# Patient Record
Sex: FEMALE | Race: WHITE | NOT HISPANIC OR LATINO | Employment: OTHER | URBAN - METROPOLITAN AREA
[De-identification: names, ages, dates, MRNs, and addresses within clinical notes are randomized per-mention and may not be internally consistent; named-entity substitution may affect disease eponyms.]

---

## 2019-01-17 ENCOUNTER — RESULTS ENCOUNTER (OUTPATIENT)
Dept: ENDOCRINOLOGY | Age: 69
End: 2019-01-17

## 2019-01-17 ENCOUNTER — OFFICE VISIT (OUTPATIENT)
Dept: ENDOCRINOLOGY | Age: 69
End: 2019-01-17

## 2019-01-17 VITALS
DIASTOLIC BLOOD PRESSURE: 70 MMHG | SYSTOLIC BLOOD PRESSURE: 116 MMHG | RESPIRATION RATE: 16 BRPM | WEIGHT: 141 LBS | HEIGHT: 64 IN | BODY MASS INDEX: 24.07 KG/M2

## 2019-01-17 DIAGNOSIS — E03.9 PRIMARY HYPOTHYROIDISM: ICD-10-CM

## 2019-01-17 DIAGNOSIS — E03.9 PRIMARY HYPOTHYROIDISM: Primary | ICD-10-CM

## 2019-01-17 DIAGNOSIS — R53.1 WEAKNESS: ICD-10-CM

## 2019-01-17 DIAGNOSIS — M81.0 POSTMENOPAUSAL OSTEOPOROSIS: ICD-10-CM

## 2019-01-17 DIAGNOSIS — N95.1 MENOPAUSAL SYMPTOMS: ICD-10-CM

## 2019-01-17 DIAGNOSIS — R41.0 CONFUSION: ICD-10-CM

## 2019-01-17 DIAGNOSIS — R53.82 CHRONIC FATIGUE: ICD-10-CM

## 2019-01-17 DIAGNOSIS — E55.9 VITAMIN D DEFICIENCY: ICD-10-CM

## 2019-01-17 PROCEDURE — 99204 OFFICE O/P NEW MOD 45 MIN: CPT | Performed by: INTERNAL MEDICINE

## 2019-01-17 RX ORDER — THYROID,PORK 90 MG
TABLET ORAL
COMMUNITY
Start: 2018-11-28 | End: 2019-02-20

## 2019-01-17 NOTE — PROGRESS NOTES
"Subjective   Diya Zamora is a 68 y.o. female seen as a new patient for hypothyroidism. She states that she also wants to be checked for adrenal fatigue. She states that she is having pain on her right side, rash, brain fog, diarrhea, weakness, and hair loss.   History of Present Illness this is a 68-year-old female known patient with history of hypothyroidism.  She is complaining of pronounced fatigue and weakness and lack of energy which is worsening since about a year and a half to 2 years ago.  She was initially placed on [place] thyroid 30 mg a day and after a while's was switched to Synthroid and levothyroxin and subsequently the dose was increased to [place] Thyroid 60 mg a day and currently for the past 3 months she has been on 90 mg a day.  She is also complaining of occasional diarrhea and her rash on her right side of torso.  Family history is positive for hypothyroidism and Hashimoto in her mother and coronary artery disease in her father which she succumbed to in his early 50s.  She is a mother of 4 and  and not sexually active.  She went through menopause around age 49.  She is also concerned about her bones as well as her gums and bone loss in her gums contributing to loss of teeth.    /70   Resp 16   Ht 162.6 cm (64\")   Wt 64 kg (141 lb)   BMI 24.20 kg/m²      Allergies no known allergies    Current Outpatient Medications:   •  ARMOUR THYROID 90 MG tablet, , Disp: , Rfl:       The following portions of the patient's history were reviewed and updated as appropriate: allergies, current medications, past family history, past medical history, past social history, past surgical history and problem list.    Review of Systems   Constitutional: Positive for fatigue.   HENT: Negative.    Eyes: Negative.    Respiratory: Negative.    Cardiovascular: Negative.    Gastrointestinal: Positive for diarrhea.   Endocrine: Negative.    Genitourinary: Negative.    Musculoskeletal: Negative.  "   Skin: Positive for rash.   Allergic/Immunologic: Negative.    Neurological: Positive for weakness.   Hematological: Negative.    Psychiatric/Behavioral: Positive for confusion.       Objective   Physical Exam   Constitutional: She is oriented to person, place, and time. She appears well-developed and well-nourished. No distress.   HENT:   Head: Normocephalic and atraumatic.   Right Ear: External ear normal.   Left Ear: External ear normal.   Nose: Nose normal.   Mouth/Throat: Oropharynx is clear and moist. No oropharyngeal exudate.   Eyes: Conjunctivae and EOM are normal. Pupils are equal, round, and reactive to light. Right eye exhibits no discharge. Left eye exhibits no discharge. No scleral icterus.   Neck: Normal range of motion. Neck supple. No JVD present. No tracheal deviation present. No thyromegaly present.   Cardiovascular: Normal rate, regular rhythm, normal heart sounds and intact distal pulses. Exam reveals no gallop and no friction rub.   No murmur heard.  Pulmonary/Chest: Effort normal and breath sounds normal. No stridor. No respiratory distress. She has no wheezes. She has no rales. She exhibits no tenderness.   Abdominal: Soft. Bowel sounds are normal. She exhibits no distension and no mass. There is no tenderness. There is no rebound and no guarding. No hernia.   Musculoskeletal: Normal range of motion. She exhibits no edema, tenderness or deformity.   Lymphadenopathy:     She has no cervical adenopathy.   Neurological: She is alert and oriented to person, place, and time. She has normal reflexes. She displays normal reflexes. No cranial nerve deficit or sensory deficit. She exhibits normal muscle tone. Coordination normal.   Skin: Skin is warm and dry. No rash noted. She is not diaphoretic. No erythema. No pallor.   Maculopapular rash on the right side of her flank area.   Psychiatric: She has a normal mood and affect. Her behavior is normal. Judgment and thought content normal.   Nursing note  and vitals reviewed.        Assessment/Plan   Diagnoses and all orders for this visit:    Primary hypothyroidism  -     T3, Free; Future  -     T4 & TSH ([facility]); Future  -     T4, Free; Future  -     Thyroglobulin With Anti-TG; Future  -     Uric Acid; Future  -     Vitamin D 25 Hydroxy; Future  -     Comprehensive Metabolic Panel; Future  -     Follicle Stimulating Hormone; Future  -     Lipid Panel; Future  -     Luteinizing Hormone; Future  -     Prolactin; Future  -     ACTH; Future  -     Cortisol; Future  -     Calcium, Ionized; Future  -     Phosphorus; Future  -     PTH, Intact; Future  -     Celiac Comprehensive Panel; Future  -     DHEA  -     DHEA-Sulfate  -     TestT+TestF+SHBG  -     Growth Hormone  -     Insulin-like Growth Factor    Chronic fatigue  -     T3, Free; Future  -     T4 & TSH ([facility]); Future  -     T4, Free; Future  -     Thyroglobulin With Anti-TG; Future  -     Uric Acid; Future  -     Vitamin D 25 Hydroxy; Future  -     Comprehensive Metabolic Panel; Future  -     Follicle Stimulating Hormone; Future  -     Lipid Panel; Future  -     Luteinizing Hormone; Future  -     Prolactin; Future  -     ACTH; Future  -     Cortisol; Future  -     Calcium, Ionized; Future  -     Phosphorus; Future  -     PTH, Intact; Future  -     Celiac Comprehensive Panel; Future  -     DHEA  -     DHEA-Sulfate  -     TestT+TestF+SHBG  -     Growth Hormone  -     Insulin-like Growth Factor    Weakness  -     T3, Free; Future  -     T4 & TSH ([facility]); Future  -     T4, Free; Future  -     Thyroglobulin With Anti-TG; Future  -     Uric Acid; Future  -     Vitamin D 25 Hydroxy; Future  -     Comprehensive Metabolic Panel; Future  -     Follicle Stimulating Hormone; Future  -     Lipid Panel; Future  -     Luteinizing Hormone; Future  -     Prolactin; Future  -     ACTH; Future  -     Cortisol; Future  -     Calcium, Ionized; Future  -     Phosphorus; Future  -     PTH, Intact; Future  -     Celiac Comprehensive  Panel; Future  -     DHEA  -     DHEA-Sulfate  -     TestT+TestF+SHBG  -     Growth Hormone  -     Insulin-like Growth Factor    Confusion  -     T3, Free; Future  -     T4 & TSH ([facility]); Future  -     T4, Free; Future  -     Thyroglobulin With Anti-TG; Future  -     Uric Acid; Future  -     Vitamin D 25 Hydroxy; Future  -     Comprehensive Metabolic Panel; Future  -     Follicle Stimulating Hormone; Future  -     Lipid Panel; Future  -     Luteinizing Hormone; Future  -     Prolactin; Future  -     ACTH; Future  -     Cortisol; Future  -     Calcium, Ionized; Future  -     Phosphorus; Future  -     PTH, Intact; Future  -     Celiac Comprehensive Panel; Future  -     DHEA  -     DHEA-Sulfate  -     TestT+TestF+SHBG  -     Growth Hormone  -     Insulin-like Growth Factor    Vitamin D deficiency  -     T3, Free; Future  -     T4 & TSH ([facility]); Future  -     T4, Free; Future  -     Thyroglobulin With Anti-TG; Future  -     Uric Acid; Future  -     Vitamin D 25 Hydroxy; Future  -     Comprehensive Metabolic Panel; Future  -     Follicle Stimulating Hormone; Future  -     Lipid Panel; Future  -     Luteinizing Hormone; Future  -     Prolactin; Future  -     ACTH; Future  -     Cortisol; Future  -     Calcium, Ionized; Future  -     Phosphorus; Future  -     PTH, Intact; Future  -     Celiac Comprehensive Panel; Future  -     DHEA  -     DHEA-Sulfate  -     TestT+TestF+SHBG  -     Growth Hormone  -     Insulin-like Growth Factor    Menopausal symptoms  -     T3, Free; Future  -     T4 & TSH ([facility]); Future  -     T4, Free; Future  -     Thyroglobulin With Anti-TG; Future  -     Uric Acid; Future  -     Vitamin D 25 Hydroxy; Future  -     Comprehensive Metabolic Panel; Future  -     Follicle Stimulating Hormone; Future  -     Lipid Panel; Future  -     Luteinizing Hormone; Future  -     Prolactin; Future  -     ACTH; Future  -     Cortisol; Future  -     Calcium, Ionized; Future  -     Phosphorus; Future  -     PTH,  Intact; Future  -     Celiac Comprehensive Panel; Future  -     DHEA  -     DHEA-Sulfate  -     TestT+TestF+SHBG  -     Growth Hormone  -     Insulin-like Growth Factor    Postmenopausal osteoporosis  -     T3, Free; Future  -     T4 & TSH ([facility]); Future  -     T4, Free; Future  -     Thyroglobulin With Anti-TG; Future  -     Uric Acid; Future  -     Vitamin D 25 Hydroxy; Future  -     Comprehensive Metabolic Panel; Future  -     Follicle Stimulating Hormone; Future  -     Lipid Panel; Future  -     Luteinizing Hormone; Future  -     Prolactin; Future  -     ACTH; Future  -     Cortisol; Future  -     Calcium, Ionized; Future  -     Phosphorus; Future  -     PTH, Intact; Future  -     Celiac Comprehensive Panel; Future  -     dexa bone density axial; Future  -     DHEA  -     DHEA-Sulfate  -     TestT+TestF+SHBG  -     Growth Hormone  -     Insulin-like Growth Factor               In summary I saw and examined this 68-year-old female for above-mentioned problems.  I reviewed her below monotonous medical records including multiple laboratory evaluations and at this point we will go ahead and order a comprehensive laboratory evaluation and as soon as the results come back we will go ahead and call for any possible modification or new medications.  I also discussed with her the physiology of thyroid gland as well as pathophysiology of hypothyroidism as well as the functions of adrenal gland.  For the time being I ask her to stay on her current medications until the results of her blood test comes back.  I am also scheduling her for a DEXA scan to assess whether or not she has osteoporosis and how it needs to be treated.  She will see Ms. Rereabraham Devries in 6 months or sooner if needed with laboratory evaluation prior to each office visit.

## 2019-01-19 LAB
DHEA SERPL-MCNC: 290 NG/DL (ref 31–701)
DHEA-S SERPL-MCNC: 163.3 UG/DL (ref 20.4–186.6)
GH SERPL-MCNC: 0.9 NG/ML (ref 0–10)
IGF-I SERPL-MCNC: 137 NG/ML (ref 38–163)
SHBG SERPL-SCNC: 160.1 NMOL/L (ref 17.3–125)
TESTOST FREE SERPL-MCNC: 1.6 PG/ML (ref 0–4.2)
TESTOST SERPL-MCNC: 21 NG/DL (ref 3–41)

## 2019-01-23 ENCOUNTER — HOSPITAL ENCOUNTER (OUTPATIENT)
Dept: BONE DENSITY | Facility: HOSPITAL | Age: 69
Discharge: HOME | End: 2019-01-23
Attending: INTERNAL MEDICINE | Admitting: INTERNAL MEDICINE

## 2019-01-23 DIAGNOSIS — M81.0 POSTMENOPAUSAL OSTEOPOROSIS: ICD-10-CM

## 2019-01-23 PROCEDURE — 77080 DXA BONE DENSITY AXIAL: CPT

## 2019-01-24 RX ORDER — RALOXIFENE HYDROCHLORIDE 60 MG/1
60 TABLET, FILM COATED ORAL DAILY
Qty: 90 TABLET | Refills: 3 | Status: SHIPPED | OUTPATIENT
Start: 2019-01-24 | End: 2020-05-08

## 2019-02-13 LAB
25(OH)D3+25(OH)D2 SERPL-MCNC: 39.7 NG/ML (ref 30–100)
ACTH PLAS-MCNC: 38 PG/ML (ref 7.2–63.3)
ALBUMIN SERPL-MCNC: 4.6 G/DL (ref 3.5–5.2)
ALBUMIN/GLOB SERPL: 1.6 G/DL
ALP SERPL-CCNC: 94 U/L (ref 39–117)
ALT SERPL-CCNC: 19 U/L (ref 1–33)
AST SERPL-CCNC: 13 U/L (ref 1–32)
BILIRUB SERPL-MCNC: 0.3 MG/DL (ref 0.1–1.2)
BUN SERPL-MCNC: 17 MG/DL (ref 8–23)
BUN/CREAT SERPL: 30.4 (ref 7–25)
CA-I SERPL ISE-MCNC: 5.8 MG/DL (ref 4.5–5.6)
CALCIUM SERPL-MCNC: 10 MG/DL (ref 8.6–10.5)
CHLORIDE SERPL-SCNC: 103 MMOL/L (ref 98–107)
CHOLEST SERPL-MCNC: 225 MG/DL (ref 0–200)
CO2 SERPL-SCNC: 24.2 MMOL/L (ref 22–29)
CORTIS SERPL-MCNC: 11.2 UG/DL
CREAT SERPL-MCNC: 0.56 MG/DL (ref 0.57–1)
ENDOMYSIUM IGA SER QL: NEGATIVE
FSH SERPL-ACNC: 52.2 MIU/ML
GLIADIN PEPTIDE IGA SER-ACNC: 3 UNITS (ref 0–19)
GLIADIN PEPTIDE IGG SER-ACNC: 5 UNITS (ref 0–19)
GLOBULIN SER CALC-MCNC: 2.8 GM/DL
GLUCOSE SERPL-MCNC: 100 MG/DL (ref 65–99)
HDLC SERPL-MCNC: 57 MG/DL (ref 40–60)
IGA SERPL-MCNC: 140 MG/DL (ref 87–352)
INTERPRETATION: NORMAL
LDLC SERPL CALC-MCNC: 119 MG/DL (ref 0–100)
LH SERPL-ACNC: 25.5 MIU/ML
PHOSPHATE SERPL-MCNC: 3.6 MG/DL (ref 2.5–4.5)
POTASSIUM SERPL-SCNC: 4.7 MMOL/L (ref 3.5–5.2)
PROLACTIN SERPL-MCNC: 7.4 NG/ML (ref 4.8–23.3)
PROT SERPL-MCNC: 7.4 G/DL (ref 6–8.5)
PTH-INTACT SERPL-MCNC: 27 PG/ML (ref 15–65)
SODIUM SERPL-SCNC: 142 MMOL/L (ref 136–145)
T3FREE SERPL-MCNC: 3.1 PG/ML (ref 2–4.4)
T4 FREE SERPL-MCNC: 1.28 NG/DL (ref 0.93–1.7)
T4 SERPL-MCNC: 8.16 MCG/DL (ref 4.5–11.7)
THYROGLOB AB SERPL-ACNC: 1.2 IU/ML (ref 0–0.9)
THYROGLOB SERPL-MCNC: 7.7 NG/ML
TRIGL SERPL-MCNC: 245 MG/DL (ref 0–150)
TSH SERPL DL<=0.005 MIU/L-ACNC: <0.005 MIU/ML (ref 0.27–4.2)
TTG IGA SER-ACNC: <2 U/ML (ref 0–3)
TTG IGG SER-ACNC: <2 U/ML (ref 0–5)
URATE SERPL-MCNC: 2.3 MG/DL (ref 2.4–5.7)
VLDLC SERPL CALC-MCNC: 49 MG/DL (ref 5–40)

## 2019-02-20 RX ORDER — LEVOTHYROXINE, LIOTHYRONINE 57; 13.5 UG/1; UG/1
90 TABLET ORAL DAILY
Qty: 30 TABLET | Refills: 5 | Status: SHIPPED | OUTPATIENT
Start: 2019-02-20 | End: 2020-05-08

## 2019-02-20 RX ORDER — PITAVASTATIN CALCIUM 4.18 MG/1
TABLET, FILM COATED ORAL
Qty: 30 TABLET | Refills: 5 | Status: SHIPPED | OUTPATIENT
Start: 2019-02-20 | End: 2020-05-08

## 2019-02-20 RX ORDER — ICOSAPENT ETHYL 1000 MG/1
2 CAPSULE ORAL 2 TIMES DAILY WITH MEALS
Qty: 360 CAPSULE | Refills: 3 | Status: SHIPPED | OUTPATIENT
Start: 2019-02-20 | End: 2020-05-08

## 2019-02-28 ENCOUNTER — PRIOR AUTHORIZATION (OUTPATIENT)
Dept: ENDOCRINOLOGY | Age: 69
End: 2019-02-28

## 2022-07-20 ENCOUNTER — TELEPHONE (OUTPATIENT)
Dept: URGENT CARE | Facility: CLINIC | Age: 72
End: 2022-07-20

## 2022-07-20 DIAGNOSIS — L01.00 IMPETIGO: Primary | ICD-10-CM

## 2023-08-07 ENCOUNTER — APPOINTMENT (OUTPATIENT)
Dept: CT IMAGING | Facility: HOSPITAL | Age: 73
End: 2023-08-07
Payer: MEDICARE

## 2023-08-07 ENCOUNTER — HOSPITAL ENCOUNTER (EMERGENCY)
Facility: HOSPITAL | Age: 73
Discharge: HOME | End: 2023-08-07
Attending: EMERGENCY MEDICINE | Admitting: EMERGENCY MEDICINE
Payer: MEDICARE

## 2023-08-07 VITALS
DIASTOLIC BLOOD PRESSURE: 77 MMHG | TEMPERATURE: 98.5 F | WEIGHT: 125 LBS | OXYGEN SATURATION: 95 % | BODY MASS INDEX: 21.34 KG/M2 | HEART RATE: 85 BPM | RESPIRATION RATE: 16 BRPM | HEIGHT: 64 IN | SYSTOLIC BLOOD PRESSURE: 145 MMHG

## 2023-08-07 DIAGNOSIS — E05.90 HYPERTHYROIDISM: Primary | ICD-10-CM

## 2023-08-07 DIAGNOSIS — G89.29 CHRONIC NONINTRACTABLE HEADACHE, UNSPECIFIED HEADACHE TYPE: ICD-10-CM

## 2023-08-07 DIAGNOSIS — R51.9 CHRONIC NONINTRACTABLE HEADACHE, UNSPECIFIED HEADACHE TYPE: ICD-10-CM

## 2023-08-07 LAB
ALBUMIN SERPL-MCNC: 4.4 G/DL (ref 3.5–5.2)
ALBUMIN/GLOB SERPL: 1.6 G/DL
ALP SERPL-CCNC: 86 U/L (ref 39–117)
ALT SERPL W P-5'-P-CCNC: 18 U/L (ref 1–33)
ANION GAP SERPL CALCULATED.3IONS-SCNC: 10.2 MMOL/L (ref 5–15)
AST SERPL-CCNC: 17 U/L (ref 1–32)
BASOPHILS # BLD AUTO: 0.04 10*3/MM3 (ref 0–0.2)
BASOPHILS NFR BLD AUTO: 0.5 % (ref 0–1.5)
BILIRUB SERPL-MCNC: 0.3 MG/DL (ref 0–1.2)
BUN SERPL-MCNC: 11 MG/DL (ref 8–23)
BUN/CREAT SERPL: 19.3 (ref 7–25)
CALCIUM SPEC-SCNC: 9.4 MG/DL (ref 8.6–10.5)
CHLORIDE SERPL-SCNC: 102 MMOL/L (ref 98–107)
CO2 SERPL-SCNC: 22.8 MMOL/L (ref 22–29)
CREAT SERPL-MCNC: 0.57 MG/DL (ref 0.57–1)
DEPRECATED RDW RBC AUTO: 42.3 FL (ref 37–54)
EGFRCR SERPLBLD CKD-EPI 2021: 95.5 ML/MIN/1.73
EOSINOPHIL # BLD AUTO: 0.09 10*3/MM3 (ref 0–0.4)
EOSINOPHIL NFR BLD AUTO: 1.2 % (ref 0.3–6.2)
ERYTHROCYTE [DISTWIDTH] IN BLOOD BY AUTOMATED COUNT: 12.9 % (ref 12.3–15.4)
GLOBULIN UR ELPH-MCNC: 2.8 GM/DL
GLUCOSE SERPL-MCNC: 90 MG/DL (ref 65–99)
HCT VFR BLD AUTO: 41.3 % (ref 34–46.6)
HGB BLD-MCNC: 13.1 G/DL (ref 12–15.9)
IMM GRANULOCYTES # BLD AUTO: 0.02 10*3/MM3 (ref 0–0.05)
IMM GRANULOCYTES NFR BLD AUTO: 0.3 % (ref 0–0.5)
LYMPHOCYTES # BLD AUTO: 3.31 10*3/MM3 (ref 0.7–3.1)
LYMPHOCYTES NFR BLD AUTO: 45 % (ref 19.6–45.3)
MCH RBC QN AUTO: 28.3 PG (ref 26.6–33)
MCHC RBC AUTO-ENTMCNC: 31.7 G/DL (ref 31.5–35.7)
MCV RBC AUTO: 89.2 FL (ref 79–97)
MONOCYTES # BLD AUTO: 0.61 10*3/MM3 (ref 0.1–0.9)
MONOCYTES NFR BLD AUTO: 8.3 % (ref 5–12)
NEUTROPHILS NFR BLD AUTO: 3.28 10*3/MM3 (ref 1.7–7)
NEUTROPHILS NFR BLD AUTO: 44.7 % (ref 42.7–76)
NRBC BLD AUTO-RTO: 0 /100 WBC (ref 0–0.2)
PLATELET # BLD AUTO: 208 10*3/MM3 (ref 140–450)
PMV BLD AUTO: 9.7 FL (ref 6–12)
POTASSIUM SERPL-SCNC: 4.4 MMOL/L (ref 3.5–5.2)
PROT SERPL-MCNC: 7.2 G/DL (ref 6–8.5)
RBC # BLD AUTO: 4.63 10*6/MM3 (ref 3.77–5.28)
SODIUM SERPL-SCNC: 135 MMOL/L (ref 136–145)
TSH SERPL DL<=0.05 MIU/L-ACNC: <0.005 UIU/ML (ref 0.27–4.2)
WBC NRBC COR # BLD: 7.35 10*3/MM3 (ref 3.4–10.8)

## 2023-08-07 PROCEDURE — 36415 COLL VENOUS BLD VENIPUNCTURE: CPT

## 2023-08-07 PROCEDURE — 85025 COMPLETE CBC W/AUTO DIFF WBC: CPT | Performed by: EMERGENCY MEDICINE

## 2023-08-07 PROCEDURE — 70450 CT HEAD/BRAIN W/O DYE: CPT

## 2023-08-07 PROCEDURE — 80053 COMPREHEN METABOLIC PANEL: CPT | Performed by: EMERGENCY MEDICINE

## 2023-08-07 PROCEDURE — 84443 ASSAY THYROID STIM HORMONE: CPT | Performed by: EMERGENCY MEDICINE

## 2023-08-07 PROCEDURE — 99284 EMERGENCY DEPT VISIT MOD MDM: CPT

## 2023-08-07 RX ORDER — LEVOTHYROXINE SODIUM 25 UG/1
25 CAPSULE ORAL DAILY
COMMUNITY
End: 2023-08-07

## 2023-08-07 NOTE — DISCHARGE INSTRUCTIONS
Pt's TSH level is < 0.005.  This is virtually unreadable.  Pt needs her thyroid medication discontinued.

## 2023-08-07 NOTE — ED PROVIDER NOTES
"Subjective   History of Present Illness  73-year-old female past medical history significant for hypothyroidism self-reported Hilary-Barr infection as well as long-haul COVID infection concerns who presents to the emergency room initially complaining of headache.  When I talk with patient she actually states she has been having headaches brain fog fullness of her external ears bilaterally fatigue and generalized malaise for months to years.  Patient states that she has been diagnosed with COVID in the past and even though she tested negative she was later found to have it anyway and as she started having those same symptoms 17 days ago she called her primary care physician who over the phone prescribed her ivermectin.  The primary care physician either saw the patient or test her for COVID.  Patient states she is also been told that she has Hilary-Barr virus and that it is still active most likely because of the COVID infection from years past.  She states that some of her symptoms she has been told is because of the Hilary-Barr virus but she thinks that her symptoms are different now.  Patient is unable to state how she thinks there are different other than this they have been happening for a long time.  Patient states that she was told by a friend who is a nurse that she should probably come in and get \"scanned \"to make sure that she does not have a brain tumor.  Patient denies fever cough neck pain jaw pain sore throat diaphoresis chest pain nausea vomiting.  She denies having any focal neurologic deficits such as weakness in arms or legs difficulty ambulating inability to speak or facial drooping.     Review of Systems   Constitutional:  Positive for fatigue. Negative for activity change, appetite change, chills, diaphoresis and fever.   HENT:  Positive for congestion and ear pain. Negative for rhinorrhea and sore throat.    Eyes:  Negative for photophobia and visual disturbance.   Respiratory:  Negative for " cough.    Cardiovascular:  Negative for chest pain, palpitations and leg swelling.   Gastrointestinal:  Negative for abdominal distention, abdominal pain, diarrhea, nausea and vomiting.   Genitourinary:  Negative for dysuria and flank pain.   Musculoskeletal:  Negative for arthralgias and back pain.   Skin:  Negative for rash.   Neurological:  Positive for headaches. Negative for dizziness and weakness.   Psychiatric/Behavioral:  Negative for agitation and behavioral problems.      Past Medical History:   Diagnosis Date    Hilary Bae infection     Herpes     Hypothyroidism        No Known Allergies    Past Surgical History:   Procedure Laterality Date    TONSILLECTOMY         History reviewed. No pertinent family history.    Social History     Socioeconomic History    Marital status:    Tobacco Use    Smoking status: Former     Packs/day: 1.00     Types: Cigarettes    Smokeless tobacco: Current   Vaping Use    Vaping Use: Never used   Substance and Sexual Activity    Alcohol use: Yes     Comment: rareloy    Drug use: No    Sexual activity: Defer           Objective   Physical Exam  Vitals and nursing note reviewed.   Constitutional:       General: She is not in acute distress.     Appearance: Normal appearance. She is not ill-appearing, toxic-appearing or diaphoretic.      Comments: Pleasant but anxious 73-year-old female in no apparent distress   HENT:      Head: Normocephalic and atraumatic.      Nose: Nose normal.      Mouth/Throat:      Mouth: Mucous membranes are moist.   Eyes:      Conjunctiva/sclera: Conjunctivae normal.   Cardiovascular:      Rate and Rhythm: Normal rate and regular rhythm.      Pulses: Normal pulses.   Pulmonary:      Effort: Pulmonary effort is normal.      Breath sounds: Normal breath sounds.   Abdominal:      General: Abdomen is flat. There is no distension.      Tenderness: There is no abdominal tenderness.   Musculoskeletal:         General: No swelling. Normal range of  motion.      Cervical back: Normal range of motion and neck supple.      Right lower leg: No edema.      Left lower leg: No edema.   Skin:     General: Skin is warm and dry.   Neurological:      General: No focal deficit present.      Mental Status: She is alert and oriented to person, place, and time.   Psychiatric:         Attention and Perception: Attention normal.         Mood and Affect: Mood is anxious.         Speech: Speech normal.         Behavior: Behavior normal.         Thought Content: Thought content normal.         Cognition and Memory: Cognition normal.       Procedures           ED Course  ED Course as of 08/07/23 1829   Mon Aug 07, 2023   1534 Discussed with patient that the lack of focal neurologic findings and the chronicity of her symptoms would indicate that this is most likely not an emergent medical condition however we can do a CT scan of her head to help alleviate concerns.  Patient states she understands and agrees with plan. [BH]   1613 CT head:  IMPRESSION:  Chronic senescent changes. No acute findings.        This report was finalized on 8/7/2023 4:10 PM by Dr. Sebastian Pabon MD.   [BH]   1703 CMP is unremarkable [BH]   1703 CBC is unremarkable [BH]   1807 Patient's primary care physician is a ROSIBEL Slade at [facility] who we have tried to reach at [phone].  There is no one answering this number only a voicemail that she can leave and no instructions on anyone on-call for emergent situations.  I wanted to talk with her primary care physician concerning the fact that she is on thyroid medication however her TSH has been normal so unreadable in the past and is so again today.  We have left a message on the provider's voicemail. [BH]   1819 I spoke at length with the patient concerning her TSH level and the fact that it was elevated in 2019 in addition today.  She states that ROSIBEL Slade has been prescribing her thyroid medication for years and she is  unsure what the levels have been in the past.  I asked the patient if she had a phone number to reach this provider and she states that she does not as that provider has recently left her practice and started out on her own.  She states that she gets a hold of her through Facebook.  I counseled patient that I am concerned that her symptoms could be related to hyperthyroidism and that she needs to stop taking her thyroid medication and have her levels rechecked.  Patient states that she will contact her APRN.  I informed patient I will provide information for her TSH level.  Patient also asked if I would provide information for primary care contact number if she wanted to change primary care physicians.  Patient understands she can follow-up emergency room if needed for new persistent or worsening symptoms. []   1825 Patient also discussed the fact that she states she has Hilary-Barr virus that is active for the last 6 years.  I asked if the patient is seen infectious disease specialist if this is the case and she says now her APRN has been taking care of it.  I suggested to her that if she truly has active Hilary-Barr virus for 6 years that she should probably be referred to an infectious disease specialist. []      ED Course User Index  [] Chino Tellez MD                                           Medical Decision Making  My differential diagnosis for headache includes but is not limited to:  Migraine, cluster, ischemic stroke, subarachnoid hemorrhage, intracranial hemorrhage, vascular malformation, cerebral aneurysm, vascular dissection, vasculitis, temporal arteritis, malignant hypertension, pheochromocytoma, cerebral venous thrombosis, preeclampsia; bacterial meningitis, viral meningitis, fungal meningitis, encephalitis, brain abscess, pleural empyema, sinusitis, dental infection, influenza, viral syndrome; carbon monoxide exposure, analgesic abuse, hypoglycemia; trigeminal neuralgia,  postherpetic neuralgia, occipital neuralgia; subdural hematoma, concussion, musculoskeletal tension, cervical osteoarthritis; glaucoma, TMJ disease, pseudotumor cerebri, post LP headache, intracranial neoplasm, sleep apnea     Amount and/or Complexity of Data Reviewed  Radiology: ordered. Decision-making details documented in ED Course.        Final diagnoses:   Hyperthyroidism   Chronic nonintractable headache, unspecified headache type       ED Disposition  ED Disposition       ED Disposition   Discharge    Condition   Stable    Comment   --               PATIENT CONNECTION - [facility] [address]  [phone]  Call       Stella Buckley, APRN  [address] [facility]  [address] [place] [address]  [phone]    Schedule an appointment as soon as possible for a visit       [facility] EMERGENCY DEPARTMENT  [address] [facility] [address] [phone]  Go to   As needed         Medication List        Stop      [place] Thyroid 30 MG tablet  Generic drug: Thyroid     IVERMECTIN PO     liothyronine 5 MCG tablet  Commonly known as: CYTOMEL     Tirosint 25 MCG capsule  Generic drug: levothyroxine sodium                 Chino Tellez MD  08/07/23 1827

## 2023-11-09 ENCOUNTER — PATIENT ROUNDING (BHMG ONLY) (OUTPATIENT)
Dept: FAMILY MEDICINE CLINIC | Facility: CLINIC | Age: 73
End: 2023-11-09
Payer: MEDICARE

## 2023-11-09 ENCOUNTER — OFFICE VISIT (OUTPATIENT)
Dept: FAMILY MEDICINE CLINIC | Facility: CLINIC | Age: 73
End: 2023-11-09
Payer: MEDICARE

## 2023-11-09 VITALS
OXYGEN SATURATION: 99 % | WEIGHT: 125 LBS | RESPIRATION RATE: 18 BRPM | BODY MASS INDEX: 21.34 KG/M2 | HEIGHT: 64 IN | DIASTOLIC BLOOD PRESSURE: 82 MMHG | SYSTOLIC BLOOD PRESSURE: 118 MMHG | HEART RATE: 83 BPM

## 2023-11-09 DIAGNOSIS — U09.9 LONG COVID: ICD-10-CM

## 2023-11-09 DIAGNOSIS — E03.9 PRIMARY HYPOTHYROIDISM: Primary | ICD-10-CM

## 2023-11-09 NOTE — PROGRESS NOTES
A [email or web address] message has been sent to the patient for [place]IENT [place] [facility]

## 2023-11-09 NOTE — PROGRESS NOTES
John Leroy DO  [facility] PRIMARY [facility]ARE  [address]  [place] [id number] [phone]    Subjective      Name Diya Zamora MRN [id number]    1950 AGE/SEX 73 y.o. / female      Chief Complaint Chief Complaint   Patient presents with    Hypothyroidism     New patient here to establish care. Would like to discuss thyroid issues     Ear Fullness     Left ear fullness    Fibromyalgia         Visit History for  2023    History of Present Illness  Diya Zamora is a 73 y.o. female who presented today for Hypothyroidism (New patient here to establish care. Would like to discuss thyroid issues ), Ear Fullness (Left ear fullness), and Fibromyalgia    New patient presents to establish care with a new provider. Accompanied by adult female friend.     Last primary care provider was Stella Buckley NP.    Only on levothyroxine and Tirosint, which are working. Has tried many medications for thyroid since 2017 and had difficulty finding something that has worked. Last TSH levels were low and had a 17-day headache that resulted in an emergency room visit. The physician in the emergency room advised patient to discontinue thyroid medication.     Reports left ear fullness, phlegm, and head congestion. Has been doing lymphatic drainage, auricular treatment, and Rife treatments. Nebulizes daily with 1 teaspoon of silver and 1 drop of grapefruit seed supplements. Denies allergies. Has used Allegra with long COVID-19. Does not like Flonase because lost sense of smell on it.    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 Deals with brain fog and diagnosed with long COVID-19 in 2023 by Stella Buckley. Was on ivermectin, steroids, Pepcid, Allegra, and CoQ10. Is not able to remember things. Had COVID-19 in 2020 and in 2022 along with double pneumonia. Started on nicotine protocol, by Dr. Corbett, for the last 2 days and feels mental clarity and  "fatigue improvement already. Started root cause protocol in 2016 and is hoping to use alternative medicine to heal and hopes all issues will disappear. Follows Root Call Protocol.     Has chronic diarrhea and uses vitamin B supplements.      Medications and Allergies   Current Outpatient Medications   Medication Instructions    liothyronine (CYTOMEL) 25 MCG tablet Take 1 tablet by mouth 2 (Two) Times a Day.    liothyronine (CYTOMEL) 5 MCG tablet 2 tablets, Oral, Daily    MAGNESIUM PO Oral    Tirosint 25 MCG capsule     triamcinolone (KENALOG) 0.1 % ointment Topical, 2 Times Daily    TURMERIC CURCUMIN PO Oral    vitamin C (ASCORBIC ACID) 250 mg, Oral, Daily     No Known Allergies   I have reviewed the above medications and allergies     Objective:      Vitals Vitals:    11/09/23 1320   BP: 118/82   BP Location: Left arm   Patient Position: Sitting   Cuff Size: Adult   Pulse: 83   Resp: 18   SpO2: 99%   Weight: 56.7 kg (125 lb)   Height: 162.6 cm (64\")     Body mass index is 21.46 kg/m².    Physical Exam  Vitals reviewed.   Constitutional:       General: She is not in acute distress.     Appearance: She is not ill-appearing.   HENT:      Ears:      Comments: Slight fluid in the left tympanic membrane.   Cardiovascular:      Rate and Rhythm: Normal rate and regular rhythm.   Pulmonary:      Effort: Pulmonary effort is normal.      Breath sounds: Normal breath sounds.   Neurological:      Mental Status: She is alert.   Psychiatric:         Mood and Affect: Mood normal.         Behavior: Behavior normal.         Thought Content: Thought content normal.         Judgment: Judgment normal.            Assessment/Plan      Issues Addressed/ Plan  1. Primary hypothyroidism  - Continue current medication regimen.    2. Long COVID  - Has ivermectin refill from previous primary care provider if needed.    Ear congestion  - Educated on mechanism of Rian technique. Recommended to try Allegra again for about 1 month. Consider " referral to ENT specialist if symptoms do not subside.       There are no Patient Instructions on file for this visit.  BMI is within normal parameters. No other follow-up for BMI required.        Follow up  recommended Return in about 6 months (around 5/9/2024).   - Dragon voice recognition software was utilized to complete this chart.  Every reasonable attempt was made to edit and correct the text, however some incorrect words may remain.   John Leroy DO    Transcribed from ambient dictation for John Leroy DO by Lesvia Hutchinson  11/09/23   17:25 EST    Patient or patient representative verbalized consent to the visit recording.  I have personally performed the services described in this document as transcribed by the above individual, and it is both accurate and complete.

## 2023-11-17 ENCOUNTER — TELEPHONE (OUTPATIENT)
Dept: FAMILY MEDICINE CLINIC | Facility: CLINIC | Age: 73
End: 2023-11-17

## 2023-11-17 NOTE — TELEPHONE ENCOUNTER
Caller: Diya Zamora    Relationship: Self    Best call back number: [phone]    Which medication are you concerned about: Tirosint 25 MCG capsule     Who prescribed you this medication: DR SANABRIA     When did you start taking this medication:     What are your concerns: PATIENT RECEIVED LETTER FROM [employer] STATING MEDICATION WOULD NOT BE COVERED ON HER PLAN EFFECTIVE JAN 1, 2024. LETTER STATES THAT PATIENT CAN ASK PROVIDER TO REQUEST AN EXCEPTION BECAUSE OTHER MEDICATIONS SHE HAS TRIED HAVE NOT WORKED FOR HER.   PLEASE ADVISE PATIENT IF DR SANABRIA WILL SEND REQUEST TO [facility] ON PATIENT'S BEHALF  PATIENT DOES NOT NEED A REFILL RIGHT NOW, BUT WOULD LIKE TO KEEP TAKING THIS MEDICATION IN THE NEW YEAR     How long have you had these concerns:

## 2023-11-28 ENCOUNTER — DOCUMENTATION (OUTPATIENT)
Dept: FAMILY MEDICINE CLINIC | Facility: CLINIC | Age: 73
End: 2023-11-28
Payer: MEDICARE

## 2023-11-28 RX ORDER — CAT'S CLAW 500 MG
500 CAPSULE ORAL 3 TIMES DAILY
COMMUNITY

## 2023-11-28 RX ORDER — WHEAT GERM OIL
385 OIL (ML) ORAL
COMMUNITY

## 2023-11-28 RX ORDER — GLUTAMINE 100 %
POWDER (GRAM) ORAL
COMMUNITY

## 2024-01-09 ENCOUNTER — TELEPHONE (OUTPATIENT)
Dept: FAMILY MEDICINE CLINIC | Facility: CLINIC | Age: 74
End: 2024-01-09
Payer: MEDICARE

## 2024-01-09 RX ORDER — LEVOTHYROXINE SODIUM 25 UG/1
CAPSULE ORAL
Qty: 90 CAPSULE | Refills: 1 | Status: SHIPPED | OUTPATIENT
Start: 2024-01-09

## 2024-01-09 NOTE — TELEPHONE ENCOUNTER
Caller: Diya Zamora    Relationship: Self    Best call back number: [phone] (Mobile) [phone] HOME IF NO ANSWER ON CELL BUT DON'T LEAVE VM ON HOME    What test/procedure requested: Tirosint 25 MCG capsule     When is it needed: ASAP- PATIENT ONLY HAS ABOUT 10 DAYS OF MEDICATION LEFT AND NEEDS THIS MEDICATION ASAP    Where is the test/procedure going to be performed: [id number] [place] [facility] [address] - [phone]  - [phone] FX     Additional information or concerns: PATIENT STATES SHE NEEDS A REFILL FOR 90 DAY SUPPLY SENT TO PHARMACY ASAP ALSO    PHARMACY TOLD PATIENT THAT THIS ALSO REQUIRES PRIOR AUTH DUE TO NEW YEAR    PLEASE ADVISE PATIENT REGARDING THIS ASAP

## 2024-01-11 RX ORDER — LIOTHYRONINE SODIUM 25 UG/1
25 TABLET ORAL 2 TIMES DAILY
Qty: 180 TABLET | Refills: 1 | Status: SHIPPED | OUTPATIENT
Start: 2024-01-11

## 2024-01-11 NOTE — TELEPHONE ENCOUNTER
Approvedon January 9  The request has been approved. The authorization is effective from 01/09/2024 to 01/08/2025, as long as the member is enrolled in their current health plan. The request was approved as submitted. A written notification letter will follow with additional details.

## 2024-01-11 NOTE — TELEPHONE ENCOUNTER
Name: Diya Zamora      Relationship: Self      Best Callback Number: [phone]       [employer] PROVIDED THE RELAY MESSAGE FROM THE OFFICE      PATIENT: HAS FURTHER QUESTIONS AND WOULD LIKE A CALL BACK AT THE FOLLOWING PHONE [phone]    ADDITIONAL INFORMATION:[employer] TOLD HER THIS MORNING THEY HAVE NOTHING ON FILE FOR PA

## 2024-01-16 ENCOUNTER — TELEPHONE (OUTPATIENT)
Dept: FAMILY MEDICINE CLINIC | Facility: CLINIC | Age: 74
End: 2024-01-16
Payer: MEDICARE

## 2024-01-16 NOTE — TELEPHONE ENCOUNTER
Caller: RYLAND STEPHENS    Relationship to patient: Other    Best call back number: [phone]    Patient is needing: RYLAND IS CALLING TO LET THE PATIENT'S PROVIDER KNOW THAT THE PATIENT'S PAPERWORK HAS BEEN UPDATED ON RYLAND'S WEBSITE. THE PROVIDER IS INVITED TO ATTEND ROUNDS BY TELEPHONE NEXT WEEK. THE NUMBER FOR THIS [phone], CHOOSE OPTION 3.

## 2024-02-09 ENCOUNTER — OFFICE VISIT (OUTPATIENT)
Dept: FAMILY MEDICINE CLINIC | Facility: CLINIC | Age: 74
End: 2024-02-09
Payer: MEDICARE

## 2024-02-09 VITALS
OXYGEN SATURATION: 97 % | RESPIRATION RATE: 18 BRPM | WEIGHT: 126 LBS | HEIGHT: 64 IN | HEART RATE: 96 BPM | DIASTOLIC BLOOD PRESSURE: 76 MMHG | SYSTOLIC BLOOD PRESSURE: 124 MMHG | BODY MASS INDEX: 21.51 KG/M2

## 2024-02-09 DIAGNOSIS — R41.0 CONFUSION: Primary | ICD-10-CM

## 2024-02-09 DIAGNOSIS — R53.82 CHRONIC FATIGUE: ICD-10-CM

## 2024-02-09 DIAGNOSIS — E55.9 VITAMIN D DEFICIENCY: ICD-10-CM

## 2024-02-09 DIAGNOSIS — E03.9 PRIMARY HYPOTHYROIDISM: ICD-10-CM

## 2024-02-09 DIAGNOSIS — R73.9 HYPERGLYCEMIA: ICD-10-CM

## 2024-02-09 DIAGNOSIS — G43.819 OTHER MIGRAINE WITHOUT STATUS MIGRAINOSUS, INTRACTABLE: ICD-10-CM

## 2024-02-09 DIAGNOSIS — E78.2 MIXED HYPERLIPIDEMIA: ICD-10-CM

## 2024-02-09 RX ORDER — KETOROLAC TROMETHAMINE 30 MG/ML
60 INJECTION, SOLUTION INTRAMUSCULAR; INTRAVENOUS ONCE
Status: COMPLETED | OUTPATIENT
Start: 2024-02-09 | End: 2024-02-09

## 2024-02-09 RX ADMIN — KETOROLAC TROMETHAMINE 60 MG: 30 INJECTION, SOLUTION INTRAMUSCULAR; INTRAVENOUS at 14:18

## 2024-02-09 NOTE — PROGRESS NOTES
John Leroy,   [facility]  [address]  [place] [id number] [phone]    Subjective      Name Diya Zamora MRN [id number]    1950 AGE/SEX 73 y.o. / female      Chief Complaint Chief Complaint   Patient presents with    Hypothyroidism     Follow up and would like labs     Headache     Headache and brain fog for the past few days          Visit History for  2024    History of Present Illness  Diya Zamora is a 73 y.o. female who presented today for Hypothyroidism (Follow up and would like labs ) and Headache (Headache and brain fog for the past few days )    Headache for several days and is interested in getting a Toradol injection. Dealing with brain fog and cognitive issues. Can hardly think at all. Sleeps fine. About 6 months ago, started waking up in the middle of the night with night terrors and has never experienced this before. Took 2 ibuprofens today, about 2 hours later took 2 Tylenol, with no relief.    Still having diarrhea. Stopped taking probiotics. Started having yogurt or sauerkraut every day. Taking calcium, L-glutamine 2 scoops a day for the gut health, and it has helped with the diarrhea.    Doing the Limbic System Rewire. Tried keto, plant-based diet, autoimmune protocols, and a noninflammatory diet, but it did not seem to make any difference. Eating a lot of fruits and vegetables, a little bit of meat, and some dark chocolates. Took turmeric in the past.     Would like to get the free T3, free T4, reverse T3, TSH, and antibodies labs.  Also would like to get her magnesium, RBC, vitamin D, 25-hydroxy, copper, and A1c checked. Tempted to stop all supplements, but wants to see the magnesium level before stopping. Taking vitamin D 5000 IU daily.         Medications and Allergies   Current Outpatient Medications   Medication Instructions    BEE POLLEN PO Oral    CALCIUM LACTATE  mg, Oral    Cats Claw 500 mg, Oral, 3  "Times Daily    CINNAMON PO 1,000 mg, Oral    COD LIVER OIL PO Oral    glutamine powder Topical    liothyronine (CYTOMEL) 5 MCG tablet 2 tablets, Oral, Daily    liothyronine (CYTOMEL) 25 mcg, Oral, 2 Times Daily    Lysine 1000 MG tablet Oral    MAGNESIUM PO Oral    Medium Chain Triglycerides (MCT OIL PO) Oral, 2 TBS    Nutritional Supplements (NUTRITIONAL SUPPLEMENT PO) 500 mg, Oral, Jigsaw mag SRT B- free    Nutritional Supplements (NUTRITIONAL SUPPLEMENT PO) Oral, 2 Times Daily, Integrative therapeutics panplex 2 phase <BR>1-2 with meals     Nutritional Supplements (NUTRITIONAL SUPPLEMENT PO) 400 mg, Oral, SAMUELS PASTE CURCUMIN     Nutritional Supplements (NUTRITIONAL SUPPLEMENT PO) Oral    Nutritional Supplements (NUTRITIONAL SUPPLEMENT PO) 400 mg, Oral, SAMUELS PASTE MADE WITH TUMERIC, COCONUT OIL , PEPPER    OIL OF OREGANO PO Oral, 4 PER DAY    OLIVE LEAF EXTRACT  mg, Oral, 3 Times Daily    PATIENT SUPPLIED MEDICATION PERFECT RODIALA PERRY 400 MG     PATIENT SUPPLIED MEDICATION FIQ RECUPERALE 1Q COPPER 2 MG     PATIENT SUPPLIED MEDICATION KAREN SEA MINERAL 2.0 ( 2 DROPPER )    PATIENT SUPPLIED MEDICATION DIATOMACEOUS EARTH 1 TSP    TAURINE  mg, Oral, PURE ENCAPSULATE TAURINE     Tirosint 25 MCG capsule TAKE 1 CAPSULE(25 MCG) BY MOUTH DAILY IN THE MORNING ON AN EMPTY STOMACH    triamcinolone (KENALOG) 0.1 % ointment Topical, 2 Times Daily    TURMERIC CURCUMIN PO Oral    vitamin C (ASCORBIC ACID) 250 mg, Oral, Daily    vitamin D3 5,000 Units, Oral, Daily    Wheat Germ Oil oil 385 mg, Oral     No Known Allergies   I have reviewed the above medications and allergies     Objective:      Vitals Vitals:    02/09/24 1306   BP: 124/76   BP Location: Left arm   Patient Position: Sitting   Cuff Size: Adult   Pulse: 96   Resp: 18   SpO2: 97%   Weight: 57.2 kg (126 lb)   Height: 162.6 cm (64\")     Body mass index is 21.63 kg/m².    Physical Exam  Vitals reviewed.   Constitutional:       General: She is not in " acute distress.     Appearance: She is not ill-appearing.   Cardiovascular:      Rate and Rhythm: Normal rate and regular rhythm.   Pulmonary:      Effort: Pulmonary effort is normal.      Breath sounds: Normal breath sounds.   Neurological:      Mental Status: She is alert.   Psychiatric:         Mood and Affect: Mood normal.         Behavior: Behavior normal.         Thought Content: Thought content normal.         Judgment: Judgment normal.            Assessment/Plan      Issues Addressed/ Plan   Diagnosis Plan   1. Confusion  MAGNESIUM, RBC    Copper, Serum    Hemoglobin A1c    Selenium Serum      2. Chronic fatigue  MAGNESIUM, RBC    Vitamin D 25 hydroxy    Copper, Serum    Comprehensive metabolic panel    Hemoglobin A1c    Selenium Serum    CBC w AUTO Differential      3. Vitamin D deficiency  Vitamin D 25 hydroxy      4. Primary hypothyroidism  Hemoglobin A1c    TSH    T4, free    T3, free    T3, Reverse      5. Mixed hyperlipidemia  Lipid panel    Hemoglobin A1c      6. Hyperglycemia  Hemoglobin A1c      7. Other migraine without status migrainosus, intractable  ketorolac (TORADOL) injection 60 mg        1. Headache.  Her headache could be due to inflammation. I will give her a Toradol injection. She will discontinue ibuprofen today. She will try an anti-inflammatory diet.     2. Diarrhea.  She will continue L-glutamine.    3. Health maintenance.  Blood work today including free T3, free T4, reverse T3, TSH, antibodies, magnesium, RBC, vitamin D, 25-hydroxy, copper, and A1c.      There are no Patient Instructions on file for this visit.  BMI is within normal parameters. No other follow-up for BMI required.         Follow up  recommended No follow-ups on file.   - Dragon voice recognition software was utilized to complete this chart.  Every reasonable attempt was made to edit and correct the text, however some incorrect words may remain.   John Leroy DO    Transcribed from ambient dictation for John  DO Rad by Hetal Rendon.  02/09/24   14:46 EST    Patient or patient representative verbalized consent to the visit recording.  I have personally performed the services described in this document as transcribed by the above individual, and it is both accurate and complete.

## 2024-02-09 NOTE — PROGRESS NOTES
Venipuncture Blood Specimen Collection  Venipuncture performed in left arm by Kat Carolina MA with good hemostasis. Patient tolerated the procedure well without complications.   02/09/24   Kat Carolina MA

## 2024-02-19 ENCOUNTER — TELEPHONE (OUTPATIENT)
Dept: FAMILY MEDICINE CLINIC | Facility: CLINIC | Age: 74
End: 2024-02-19
Payer: MEDICARE

## 2024-02-19 NOTE — TELEPHONE ENCOUNTER
Caller: Diya Zamora    Relationship: Self    Best call back number: [phone]    Caller requesting test results: PATIENT    What test was performed: LABS    When was the test performed: 2/9/24    Where was the test performed: OFFICE      IF SHE IS NOT AVAILABLE PLEASE LEAVE HER A MESSAGE

## 2024-02-21 LAB
25(OH)D3+25(OH)D2 SERPL-MCNC: 52.1 NG/ML (ref 30–100)
ALBUMIN SERPL-MCNC: 4.6 G/DL (ref 3.8–4.8)
ALBUMIN/GLOB SERPL: 1.8 {RATIO} (ref 1.2–2.2)
ALP SERPL-CCNC: 78 IU/L (ref 44–121)
ALT SERPL-CCNC: 18 IU/L (ref 0–32)
AST SERPL-CCNC: 21 IU/L (ref 0–40)
BASOPHILS # BLD AUTO: 0 X10E3/UL (ref 0–0.2)
BASOPHILS NFR BLD AUTO: 1 %
BILIRUB SERPL-MCNC: 0.4 MG/DL (ref 0–1.2)
BUN SERPL-MCNC: 12 MG/DL (ref 8–27)
BUN/CREAT SERPL: 17 (ref 12–28)
CALCIUM SERPL-MCNC: 10.4 MG/DL (ref 8.7–10.3)
CHLORIDE SERPL-SCNC: 101 MMOL/L (ref 96–106)
CHOLEST SERPL-MCNC: 224 MG/DL (ref 100–199)
CO2 SERPL-SCNC: 23 MMOL/L (ref 20–29)
COPPER SERPL-MCNC: 126 UG/DL (ref 80–158)
CREAT SERPL-MCNC: 0.72 MG/DL (ref 0.57–1)
EGFRCR SERPLBLD CKD-EPI 2021: 88 ML/MIN/1.73
EOSINOPHIL # BLD AUTO: 0.1 X10E3/UL (ref 0–0.4)
EOSINOPHIL NFR BLD AUTO: 1 %
ERYTHROCYTE [DISTWIDTH] IN BLOOD BY AUTOMATED COUNT: 12 % (ref 11.7–15.4)
GLOBULIN SER CALC-MCNC: 2.5 G/DL (ref 1.5–4.5)
GLUCOSE SERPL-MCNC: 98 MG/DL (ref 70–99)
HBA1C MFR BLD: 5.8 % (ref 4.8–5.6)
HCT VFR BLD AUTO: 44 % (ref 34–46.6)
HDLC SERPL-MCNC: 69 MG/DL
HGB BLD-MCNC: 14.1 G/DL (ref 11.1–15.9)
IMM GRANULOCYTES # BLD AUTO: 0 X10E3/UL (ref 0–0.1)
IMM GRANULOCYTES NFR BLD AUTO: 0 %
LDLC SERPL CALC-MCNC: 138 MG/DL (ref 0–99)
LYMPHOCYTES # BLD AUTO: 2.6 X10E3/UL (ref 0.7–3.1)
LYMPHOCYTES NFR BLD AUTO: 44 %
MAGNESIUM RBC-MCNC: 4.6 MG/DL (ref 3.7–7)
MCH RBC QN AUTO: 27.8 PG (ref 26.6–33)
MCHC RBC AUTO-ENTMCNC: 32 G/DL (ref 31.5–35.7)
MCV RBC AUTO: 87 FL (ref 79–97)
MONOCYTES # BLD AUTO: 0.5 X10E3/UL (ref 0.1–0.9)
MONOCYTES NFR BLD AUTO: 9 %
NEUTROPHILS # BLD AUTO: 2.7 X10E3/UL (ref 1.4–7)
NEUTROPHILS NFR BLD AUTO: 45 %
PLATELET # BLD AUTO: 233 X10E3/UL (ref 150–450)
POTASSIUM SERPL-SCNC: 4 MMOL/L (ref 3.5–5.2)
PROT SERPL-MCNC: 7.1 G/DL (ref 6–8.5)
RBC # BLD AUTO: 5.08 X10E6/UL (ref 3.77–5.28)
SELENIUM SERPL-MCNC: 116 UG/L (ref 93–198)
SODIUM SERPL-SCNC: 138 MMOL/L (ref 134–144)
T3FREE SERPL-MCNC: 4.1 PG/ML (ref 2–4.4)
T3REVERSE SERPL-MCNC: 8.6 NG/DL (ref 9.2–24.1)
T4 FREE SERPL-MCNC: 0.48 NG/DL (ref 0.82–1.77)
TRIGL SERPL-MCNC: 99 MG/DL (ref 0–149)
TSH SERPL DL<=0.005 MIU/L-ACNC: <0.005 UIU/ML (ref 0.45–4.5)
VLDLC SERPL CALC-MCNC: 17 MG/DL (ref 5–40)
WBC # BLD AUTO: 5.9 X10E3/UL (ref 3.4–10.8)

## 2024-03-12 ENCOUNTER — TELEPHONE (OUTPATIENT)
Dept: FAMILY MEDICINE CLINIC | Facility: CLINIC | Age: 74
End: 2024-03-12

## 2024-03-12 ENCOUNTER — OFFICE VISIT (OUTPATIENT)
Dept: FAMILY MEDICINE CLINIC | Facility: CLINIC | Age: 74
End: 2024-03-12
Payer: MEDICARE

## 2024-03-12 VITALS
SYSTOLIC BLOOD PRESSURE: 124 MMHG | WEIGHT: 129 LBS | DIASTOLIC BLOOD PRESSURE: 66 MMHG | HEIGHT: 64 IN | BODY MASS INDEX: 22.02 KG/M2 | HEART RATE: 78 BPM | OXYGEN SATURATION: 98 %

## 2024-03-12 DIAGNOSIS — Z09 ENCOUNTER FOR FOLLOW-UP EXAMINATION AFTER COMPLETED TREATMENT FOR CONDITIONS OTHER THAN MALIGNANT NEOPLASM: ICD-10-CM

## 2024-03-12 DIAGNOSIS — M85.80 OSTEOPENIA, UNSPECIFIED LOCATION: ICD-10-CM

## 2024-03-12 DIAGNOSIS — Z00.00 MEDICARE ANNUAL WELLNESS VISIT, SUBSEQUENT: Primary | ICD-10-CM

## 2024-03-12 PROCEDURE — G0439 PPPS, SUBSEQ VISIT: HCPCS | Performed by: STUDENT IN AN ORGANIZED HEALTH CARE EDUCATION/TRAINING PROGRAM

## 2024-03-12 PROCEDURE — 1160F RVW MEDS BY RX/DR IN RCRD: CPT | Performed by: STUDENT IN AN ORGANIZED HEALTH CARE EDUCATION/TRAINING PROGRAM

## 2024-03-12 PROCEDURE — 1159F MED LIST DOCD IN RCRD: CPT | Performed by: STUDENT IN AN ORGANIZED HEALTH CARE EDUCATION/TRAINING PROGRAM

## 2024-03-12 NOTE — TELEPHONE ENCOUNTER
Caller: Diya Zamora    Relationship: Self    Best call back number: [phone]    What is the best time to reach you: ANYTIME     What was the call regarding: PATIENT WOULD LIKE TO KNOW IF A LIVING WILL KIT COULD BE MAILED TO HER HOME ADDRESS.

## 2024-03-12 NOTE — PROGRESS NOTES
The ABCs of the Annual Wellness Visit  Subsequent Medicare Wellness Visit    Chief Complaint   Patient presents with    Medicare Wellness-subsequent      Subjective    History of Present Illness:  Diya Zamora is a 73 y.o. female who presents for a Subsequent Medicare Wellness Visit.    The following portions of the patient's history were reviewed and   updated as appropriate: allergies, current medications, past family history, past medical history, past social history, past surgical history, and problem list.    Compared to one year ago, the patient feels her physical   health is better.    Compared to one year ago, the patient feels her mental   health is better.    Recent Hospitalizations:  She was not admitted to the hospital during the last year.       Current Medical Providers:  Patient Care Team:  John Leroy DO as PCP - General (Family Medicine)    Outpatient Medications Prior to Visit   Medication Sig Dispense Refill    BEE POLLEN PO Take  by mouth.      Berberine Chloride (BERBERINE HCI PO) Take 1,000 mg by mouth Daily.      CALCIUM LACTATE PO Take 260 mg by mouth.      glutamine powder 3,000 mg Daily.      liothyronine (CYTOMEL) 25 MCG tablet Take 1 tablet by mouth 2 (Two) Times a Day. 180 tablet 1    liothyronine (CYTOMEL) 5 MCG tablet Take 2 tablets by mouth Daily.      Lysine 1000 MG tablet Take  by mouth.      MAGNESIUM PO Take  by mouth.      Medium Chain Triglycerides (MCT OIL PO) Take  by mouth. 2 TBS      Nutritional Supplements (NUTRITIONAL SUPPLEMENT PO) Take 500 mg by mouth. Jigsaw mag SRT B- free      Nutritional Supplements (NUTRITIONAL SUPPLEMENT PO) Take  by mouth 2 (Two) Times a Day. Integrative therapeutics panplex 2 phase   1-2 with meals      Nutritional Supplements (NUTRITIONAL SUPPLEMENT PO) Take 400 mg by mouth. SAMUELS PASTE MADE WITH TUMERIC, COCONUT OIL , PEPPER      PATIENT SUPPLIED MEDICATION PERFECT RODIALA PERRY 400 MG      PATIENT SUPPLIED MEDICATION FIQ RECUPERALE  1Q COPPER 2 MG      PATIENT SUPPLIED MEDICATION KAREN SEA MINERAL 2.0 ( 2 DROPPER )      PATIENT SUPPLIED MEDICATION DIATOMACEOUS EARTH 1 TSP      Selenium 200 MCG capsule Take 200 mg by mouth Daily.      TAURINE PO Take 500 mg by mouth. PURE ENCAPSULATE TAURINE      Tirosint 25 MCG capsule TAKE 1 CAPSULE(25 MCG) BY MOUTH DAILY IN THE MORNING ON AN EMPTY STOMACH 90 capsule 1    TURMERIC CURCUMIN PO Take  by mouth.      vitamin C (ASCORBIC ACID) 250 MG tablet Take 1 tablet by mouth Daily.      vitamin D3 125 MCG (5000 UT) capsule capsule Take 1 capsule by mouth Daily.      Wheat Germ Oil oil Take 385 mg by mouth.      Cats Claw 500 MG capsule Take 500 mg by mouth 3 (Three) Times a Day.      COD LIVER OIL PO Take  by mouth.      OIL OF OREGANO PO Take  by mouth. 4 PER DAY      OLIVE LEAF EXTRACT PO Take 400 mg by mouth 3 (Three) Times a Day.      triamcinolone (KENALOG) 0.1 % ointment Apply  topically to the appropriate area as directed 2 (Two) Times a Day. 1 tube 0    CINNAMON PO Take 1,000 mg by mouth. (Patient not taking: Reported on 3/12/2024)      Nutritional Supplements (NUTRITIONAL SUPPLEMENT PO) Take 400 mg by mouth. SAMUELS PASTE CURCUMIN      Nutritional Supplements (NUTRITIONAL SUPPLEMENT PO) Take  by mouth.       No facility-administered medications prior to visit.       No opioid medication identified on active medication list. I have reviewed chart for other potential  high risk medication/s and harmful drug interactions in the elderly.        Aspirin is not on active medication list.  Aspirin use is not indicated based on review of current medical condition/s. Risk of harm outweighs potential benefits.  .    Patient Active Problem List   Diagnosis    Confusion    Chronic fatigue    Primary hypothyroidism    Postmenopausal osteoporosis    Vitamin D deficiency    Menopausal symptoms     Advance Care Planning  Advance Directive is not on file.  ACP discussion was held with the patient during this visit.  "Patient does not have an advance directive, information provided.          Objective    Vitals:    03/12/24 1049   BP: 124/66   Pulse: 78   SpO2: 98%   Weight: 58.5 kg (129 lb)   Height: 162.6 cm (64.02\")     Estimated body mass index is 22.13 kg/m² as calculated from the following:    Height as of this encounter: 162.6 cm (64.02\").    Weight as of this encounter: 58.5 kg (129 lb).    BMI is within normal parameters. No other follow-up for BMI required.      Does the patient have evidence of cognitive impairment? Yes    Physical Exam  Vitals reviewed.   Constitutional:       General: She is not in acute distress.     Appearance: She is not ill-appearing.   Cardiovascular:      Rate and Rhythm: Normal rate and regular rhythm.   Pulmonary:      Effort: Pulmonary effort is normal.      Breath sounds: Normal breath sounds.   Neurological:      Mental Status: She is alert.   Psychiatric:         Mood and Affect: Mood normal.         Behavior: Behavior normal.         Thought Content: Thought content normal.         Judgment: Judgment normal.       Lab Results   Component Value Date    CHLPL 224 (H) 02/09/2024    TRIG 99 02/09/2024    HDL 69 02/09/2024     (H) 02/09/2024    VLDL 17 02/09/2024    HGBA1C 5.8 (H) 02/09/2024            HEALTH RISK ASSESSMENT    Smoking Status:  Social History     Tobacco Use   Smoking Status Former    Current packs/day: 1.00    Average packs/day: 1 pack/day for 20.0 years (20.0 ttl pk-yrs)    Types: Cigarettes    Passive exposure: Past   Smokeless Tobacco Never     Alcohol Consumption:  Social History     Substance and Sexual Activity   Alcohol Use Defer    Comment: social     Fall Risk Screen:    STEADI Fall Risk Assessment was completed, and patient is at LOW risk for falls.Assessment completed on:3/12/2024    Depression Screening:      3/12/2024    10:00 AM   PHQ-2/PHQ-9 Depression Screening   Little Interest or Pleasure in Doing Things 0-->not at all   Feeling Down, Depressed or " Hopeless 0-->not at all   PHQ-9: Brief Depression Severity Measure Score 0       Health Habits and Functional and Cognitive Screening:      3/12/2024    10:00 AM   Functional & Cognitive Status   Do you have difficulty preparing food and eating? No   Do you have difficulty bathing yourself, getting dressed or grooming yourself? No   Do you have difficulty using the toilet? No   Do you have difficulty moving around from place to place? No   Do you have trouble with steps or getting out of a bed or a chair? No   Current Diet Well Balanced Diet   Dental Exam Up to date   Eye Exam Up to date   Exercise (times per week) 5 times per week   Current Exercises Include Gardening   Do you need help using the phone?  No   Are you deaf or do you have serious difficulty hearing?  No   Do you need help to go to places out of walking distance? No   Do you need help shopping? No   Do you need help preparing meals?  No   Do you need help with housework?  No   Do you need help with laundry? No   Do you need help taking your medications? No   Do you need help managing money? No   Do you ever drive or ride in a car without wearing a seat belt? No   Have you felt unusual stress, anger or loneliness in the last month? No   Who do you live with? Alone   If you need help, do you have trouble finding someone available to you? No   Do you have difficulty concentrating, remembering or making decisions? No       Age-appropriate Screening Schedule:  Refer to the list below for future screening recommendations based on patient's age, sex and/or medical conditions. Orders for these recommended tests are listed in the plan section. The patient has been provided with a written plan.    Health Maintenance   Topic Date Due    COLORECTAL CANCER SCREENING  Never done    ZOSTER VACCINE (1 of 2) Never done    RSV Vaccine - Adults (1 - 1-dose 60+ series) Never done    Pneumococcal Vaccine 65+ (1 of 1 - PCV) Never done    TDAP/TD VACCINES (2 - Tdap)  01/05/2015    HEPATITIS C SCREENING  Never done    ANNUAL WELLNESS VISIT  Never done    COVID-19 Vaccine (1 - 2023-24 season) Never done    INFLUENZA VACCINE  03/31/2024 (Originally 8/1/2023)    MAMMOGRAM  03/12/2025 (Originally 1950)    LIPID PANEL  02/09/2025    DXA SCAN  03/27/2026              Assessment & Plan   CMS Preventative Services Quick Reference  Risk Factors Identified During Encounter  None Identified  The above risks/problems have been discussed with the patient.  Follow up actions/plans if indicated are seen below in the Assessment/Plan Section.  Pertinent information has been shared with the patient in the After Visit Summary.    Diagnoses and all orders for this visit:    1. Medicare annual wellness visit, subsequent (Primary)    2. Osteopenia, unspecified location  -     DEXA Bone Density Axial; Future    3. Encounter for follow-up examination after completed treatment for conditions other than malignant neoplasm  -     DEXA Bone Density Axial; Future        Follow Up:   Return in about 6 months (around 9/12/2024) for Diabetes.     An After Visit Summary and PPPS were made available to the patient.

## 2024-03-27 ENCOUNTER — APPOINTMENT (OUTPATIENT)
Dept: BONE DENSITY | Facility: HOSPITAL | Age: 74
End: 2024-03-27
Payer: MEDICARE

## 2024-03-27 DIAGNOSIS — Z09 ENCOUNTER FOR FOLLOW-UP EXAMINATION AFTER COMPLETED TREATMENT FOR CONDITIONS OTHER THAN MALIGNANT NEOPLASM: ICD-10-CM

## 2024-03-27 DIAGNOSIS — M85.80 OSTEOPENIA, UNSPECIFIED LOCATION: ICD-10-CM

## 2024-03-27 PROCEDURE — 77080 DXA BONE DENSITY AXIAL: CPT

## 2024-06-05 RX ORDER — LIOTHYRONINE SODIUM 5 UG/1
10 TABLET ORAL DAILY
Qty: 180 TABLET | Refills: 1 | Status: SHIPPED | OUTPATIENT
Start: 2024-06-05

## 2024-06-05 RX ORDER — LIOTHYRONINE SODIUM 5 UG/1
2 TABLET ORAL DAILY
Qty: 180 TABLET | OUTPATIENT
Start: 2024-06-05

## 2024-06-24 ENCOUNTER — TELEPHONE (OUTPATIENT)
Dept: FAMILY MEDICINE CLINIC | Facility: CLINIC | Age: 74
End: 2024-06-24
Payer: MEDICARE

## 2024-06-24 NOTE — TELEPHONE ENCOUNTER
Caller: Diya Zamora    Relationship: Self    Best call back number: [phone]    Who are you requesting to speak with (clinical staff, provider,  specific staff member): CLINICAL     What was the call regarding: PATIENT STATES SHE WAS ADVISED TO GIVE A STOOL SAMPLE BY ANOTHER PROVIDER. PATIENT ASKS HOW TO DO THIS?  PLEASE ADVISE

## 2024-07-01 ENCOUNTER — OFFICE VISIT (OUTPATIENT)
Dept: FAMILY MEDICINE CLINIC | Facility: CLINIC | Age: 74
End: 2024-07-01
Payer: MEDICARE

## 2024-07-01 ENCOUNTER — LAB (OUTPATIENT)
Dept: LAB | Facility: HOSPITAL | Age: 74
End: 2024-07-01
Payer: MEDICARE

## 2024-07-01 VITALS
RESPIRATION RATE: 16 BRPM | HEIGHT: 64 IN | HEART RATE: 96 BPM | DIASTOLIC BLOOD PRESSURE: 66 MMHG | BODY MASS INDEX: 20.14 KG/M2 | WEIGHT: 118 LBS | SYSTOLIC BLOOD PRESSURE: 112 MMHG | OXYGEN SATURATION: 98 %

## 2024-07-01 DIAGNOSIS — R19.7 DIARRHEA, UNSPECIFIED TYPE: Primary | ICD-10-CM

## 2024-07-01 DIAGNOSIS — R73.9 HYPERGLYCEMIA: ICD-10-CM

## 2024-07-01 DIAGNOSIS — D64.89 OTHER SPECIFIED ANEMIAS: ICD-10-CM

## 2024-07-01 DIAGNOSIS — R41.0 CONFUSION: ICD-10-CM

## 2024-07-01 DIAGNOSIS — E03.9 PRIMARY HYPOTHYROIDISM: ICD-10-CM

## 2024-07-01 DIAGNOSIS — R53.82 CHRONIC FATIGUE: ICD-10-CM

## 2024-07-01 NOTE — PROGRESS NOTES
Venipuncture Blood Specimen Collection  Venipuncture performed in left arm by Kat Carolina MA with good hemostasis. Patient tolerated the procedure well without complications.   07/01/24   Kat Carolina MA

## 2024-07-02 ENCOUNTER — LAB (OUTPATIENT)
Dept: LAB | Facility: HOSPITAL | Age: 74
End: 2024-07-02
Payer: MEDICARE

## 2024-07-02 LAB
ADV 40+41 DNA STL QL NAA+NON-PROBE: NOT DETECTED
ASTRO TYP 1-8 RNA STL QL NAA+NON-PROBE: NOT DETECTED
C CAYETANENSIS DNA STL QL NAA+NON-PROBE: NOT DETECTED
C COLI+JEJ+UPSA DNA STL QL NAA+NON-PROBE: NOT DETECTED
C DIFF GDH + TOXINS A+B STL QL IA.RAPID: NEGATIVE
C DIFF GDH + TOXINS A+B STL QL IA.RAPID: NEGATIVE
CRYPTOSP DNA STL QL NAA+NON-PROBE: NOT DETECTED
E HISTOLYT DNA STL QL NAA+NON-PROBE: NOT DETECTED
EAEC PAA PLAS AGGR+AATA ST NAA+NON-PRB: NOT DETECTED
EC STX1+STX2 GENES STL QL NAA+NON-PROBE: NOT DETECTED
EPEC EAE GENE STL QL NAA+NON-PROBE: NOT DETECTED
ETEC LTA+ST1A+ST1B TOX ST NAA+NON-PROBE: NOT DETECTED
G LAMBLIA DNA STL QL NAA+NON-PROBE: NOT DETECTED
NOROVIRUS GI+II RNA STL QL NAA+NON-PROBE: NOT DETECTED
P SHIGELLOIDES DNA STL QL NAA+NON-PROBE: NOT DETECTED
RVA RNA STL QL NAA+NON-PROBE: NOT DETECTED
S ENT+BONG DNA STL QL NAA+NON-PROBE: NOT DETECTED
SAPO I+II+IV+V RNA STL QL NAA+NON-PROBE: NOT DETECTED
SHIGELLA SP+EIEC IPAH ST NAA+NON-PROBE: NOT DETECTED
V CHOL+PARA+VUL DNA STL QL NAA+NON-PROBE: NOT DETECTED
V CHOLERAE DNA STL QL NAA+NON-PROBE: NOT DETECTED
Y ENTEROCOL DNA STL QL NAA+NON-PROBE: NOT DETECTED

## 2024-07-02 PROCEDURE — 89125 SPECIMEN FAT STAIN: CPT | Performed by: STUDENT IN AN ORGANIZED HEALTH CARE EDUCATION/TRAINING PROGRAM

## 2024-07-02 PROCEDURE — 87507 IADNA-DNA/RNA PROBE TQ 12-25: CPT | Performed by: STUDENT IN AN ORGANIZED HEALTH CARE EDUCATION/TRAINING PROGRAM

## 2024-07-02 PROCEDURE — 87045 FECES CULTURE AEROBIC BACT: CPT | Performed by: STUDENT IN AN ORGANIZED HEALTH CARE EDUCATION/TRAINING PROGRAM

## 2024-07-02 PROCEDURE — 87427 SHIGA-LIKE TOXIN AG IA: CPT | Performed by: STUDENT IN AN ORGANIZED HEALTH CARE EDUCATION/TRAINING PROGRAM

## 2024-07-02 PROCEDURE — 87324 CLOSTRIDIUM AG IA: CPT | Performed by: STUDENT IN AN ORGANIZED HEALTH CARE EDUCATION/TRAINING PROGRAM

## 2024-07-02 PROCEDURE — 87205 SMEAR GRAM STAIN: CPT | Performed by: STUDENT IN AN ORGANIZED HEALTH CARE EDUCATION/TRAINING PROGRAM

## 2024-07-02 PROCEDURE — 87102 FUNGUS ISOLATION CULTURE: CPT | Performed by: STUDENT IN AN ORGANIZED HEALTH CARE EDUCATION/TRAINING PROGRAM

## 2024-07-02 PROCEDURE — 87046 STOOL CULTR AEROBIC BACT EA: CPT | Performed by: STUDENT IN AN ORGANIZED HEALTH CARE EDUCATION/TRAINING PROGRAM

## 2024-07-02 PROCEDURE — 87449 NOS EACH ORGANISM AG IA: CPT | Performed by: STUDENT IN AN ORGANIZED HEALTH CARE EDUCATION/TRAINING PROGRAM

## 2024-07-03 LAB
FATTY ACIDS: NORMAL
NEUTRAL FATS: NORMAL
WBC STL QL MICRO: NORMAL

## 2024-07-05 LAB
ALBUMIN SERPL-MCNC: 4.6 G/DL (ref 3.8–4.8)
ALP SERPL-CCNC: 98 IU/L (ref 44–121)
ALT SERPL-CCNC: 13 IU/L (ref 0–32)
AST SERPL-CCNC: 14 IU/L (ref 0–40)
BILIRUB SERPL-MCNC: 0.2 MG/DL (ref 0–1.2)
BUN SERPL-MCNC: 15 MG/DL (ref 8–27)
BUN/CREAT SERPL: 24 (ref 12–28)
CALCIUM SERPL-MCNC: 10.3 MG/DL (ref 8.7–10.3)
CHLORIDE SERPL-SCNC: 103 MMOL/L (ref 96–106)
CO2 SERPL-SCNC: 24 MMOL/L (ref 20–29)
CREAT SERPL-MCNC: 0.62 MG/DL (ref 0.57–1)
EGFRCR SERPLBLD CKD-EPI 2021: 93 ML/MIN/1.73
ENDOMYSIUM IGA SER QL: NEGATIVE
GLOBULIN SER CALC-MCNC: 2.3 G/DL (ref 1.5–4.5)
GLUCOSE SERPL-MCNC: 94 MG/DL (ref 70–99)
HBA1C MFR BLD: 5.8 % (ref 4.8–5.6)
IGA SERPL-MCNC: 158 MG/DL (ref 64–422)
POTASSIUM SERPL-SCNC: 4.7 MMOL/L (ref 3.5–5.2)
PROT SERPL-MCNC: 6.9 G/DL (ref 6–8.5)
SODIUM SERPL-SCNC: 140 MMOL/L (ref 134–144)
T3FREE SERPL-MCNC: 7.9 PG/ML (ref 2–4.4)
T3REVERSE SERPL-MCNC: 8.4 NG/DL (ref 9.2–24.1)
T4 FREE SERPL-MCNC: 0.54 NG/DL (ref 0.82–1.77)
TSH SERPL DL<=0.005 MIU/L-ACNC: <0.005 UIU/ML (ref 0.45–4.5)
TTG IGA SER-ACNC: <2 U/ML (ref 0–3)
VIT B12 SERPL-MCNC: 354 PG/ML (ref 232–1245)

## 2024-07-06 LAB
BACTERIA SPEC CULT: NORMAL
BACTERIA SPEC CULT: NORMAL
CAMPYLOBACTER STL CULT: NORMAL
E COLI SXT STL QL IA: NEGATIVE
SALM + SHIG STL CULT: NORMAL

## 2024-07-07 NOTE — PROGRESS NOTES
John Leroy,   [facility]  [address]  [place] [id number] [phone]    Subjective      Name Diya Zamora MRN [id number]    1950 AGE/SEX 74 y.o. / female      Chief Complaint Chief Complaint   Patient presents with    Diarrhea     Has been working with Naturopath to work on eliminating diarrhea.  Has not been able to do so and was recommended to come in for a stool sample. Having weakness, fatigue, confusion, woozy and aches           Visit History for  2024    Diya Zamora is a 74 y.o. female who presented today for Diarrhea (Has been working with Naturopath to work on eliminating diarrhea.  Has not been able to do so and was recommended to come in for a stool sample. Having weakness, fatigue, confusion, woozy and aches  )     History of Present Illness    The patient reports experiencing weakness, dizziness, tremors, fatigue, confusion, and diarrhea. She describes a sensation of internal trembling in her hands and legs, akin to a vibrating sensation, which impedes her ability to write. She has experienced weight loss and suspects a nutritional deficiency.    The patient has been experiencing chronic diarrhea for several years. On 2023, she sought care at an urgent care facility where she was prescribed clotrimazole cream, fluconazole, and a pill. She was diagnosed with a fungal infection, a condition she had a couple of years ago. On 2023, she initiated a 2-week course of amoxicillin for dental surgery. On 2023, she underwent dental surgery. She consulted her naturopath on 2023, who recommended eliminating gluten and dairy from her diet, which she has already completed. She was also advised to use a rice machine for 2 weeks. If her symptoms persist, she was advised to see her primary care physician for a stool culture. Her naturopath prescribed Diflucan, fluconazole, and a probiotic called Espumardy, which  she has been taking for 1 month. Her diarrhea was more severe last week, and she suspects a stomach bug. Her diarrhea was projectile, liquid, and frequent, but has since returned to normal mushy stools. She has 4 to 6 bowel movements per day. Gluten was trialed, which seemed to help. 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 She also had a fungal infection on her abdomen and under her breasts, a symptom she had not previously experienced. She was treated by the same practitioner for syphilis 6 years ago. She is unsure if she has ever been treated for C. difficile. Her naturopath informed her that the antifungal and aspartame are standard treatment for C. difficile, which she has been attempting to reduce due to antibiotic use.    Supplemental Information  She is on 60 mcg of T3 and 25 mcg of T4. She is still taking berberine for prediabetes.       Medications and Allergies   Current Outpatient Medications   Medication Instructions    BEE POLLEN PO Oral    Berberine Chloride (BERBERINE HCI PO) 1,000 mg, Oral, Daily    CALCIUM LACTATE  mg, Oral    clotrimazole (LOTRIMIN) 1 % cream 1 Application, Topical, 2 Times Daily    fluconazole (DIFLUCAN) 100 mg, Oral, Every 3 Days    glutamine powder 3,000 mg, Daily    liothyronine (CYTOMEL) 25 mcg, Oral, 2 Times Daily    liothyronine (CYTOMEL) 10 mcg, Oral, Daily    Lysine 1000 MG tablet Oral    MAGNESIUM PO Oral    Medium Chain Triglycerides (MCT OIL PO) Oral, 2 TBS    Nutritional Supplements (NUTRITIONAL SUPPLEMENT PO) 500 mg, Oral, Jigsaw mag SRT B- free    Nutritional Supplements (NUTRITIONAL SUPPLEMENT PO) Oral, 2 Times Daily, Integrative therapeutics panplex 2 phase <BR>1-2 with meals     Nutritional Supplements (NUTRITIONAL SUPPLEMENT PO) 400 mg    PATIENT SUPPLIED MEDICATION PERFECT RODIALA PERRY 400 MG    PATIENT SUPPLIED MEDICATION FIQ RECUPERALE 1Q COPPER 2 MG    PATIENT SUPPLIED MEDICATION KAREN SEA MINERAL 2.0 ( 2  "DROPPER )    PATIENT SUPPLIED MEDICATION DIATOMACEOUS EARTH 1 TSP    Probiotic Product (PROBIOTIC BLEND PO) Oral, 2 Times Daily, Sbulardii     Selenium 200 mg, Oral, Daily    TAURINE  mg, Oral, PURE ENCAPSULATE TAURINE     Tirosint 25 MCG capsule TAKE 1 CAPSULE(25 MCG) BY MOUTH DAILY IN THE MORNING ON AN EMPTY STOMACH    TURMERIC CURCUMIN PO Take  by mouth.    vitamin C (ASCORBIC ACID) 250 mg, Oral, Daily    vitamin D3 5,000 Units, Oral, Daily    Wheat Germ Oil oil 385 mg, Oral     No Known Allergies   I have reviewed the above medications and allergies     Objective:      Vitals Vitals:    07/01/24 1154   BP: 112/66   BP Location: Left arm   Patient Position: Sitting   Cuff Size: Adult   Pulse: 96   Resp: 16   SpO2: 98%   Weight: 53.5 kg (118 lb)   Height: 162.6 cm (64\")     Body mass index is 20.25 kg/m².    Physical Exam  Vitals reviewed.   Constitutional:       General: She is not in acute distress.     Appearance: She is not ill-appearing.   Pulmonary:      Effort: Pulmonary effort is normal.   Psychiatric:         Mood and Affect: Mood normal.         Behavior: Behavior normal.         Thought Content: Thought content normal.         Judgment: Judgment normal.          Physical Exam       Results  Laboratory Studies  T4 and T3 were low.     Assessment/Plan   Issues Addressed/ Plan   Diagnosis Plan   1. Diarrhea, unspecified type  Celiac Disease Panel    Comprehensive metabolic panel    Clostridioides difficile EIA - Stool, Per Rectum    Fecal Fat, Qualitative - Stool, Per Rectum    Fecal Leukocytes - Stool, Per Rectum    Fungus Culture - Stool, Per Rectum    Gastrointestinal Panel, PCR - Stool, Per Rectum    Stool Culture (Reference Lab) - Stool, Per Rectum      2. Other specified anemias  CBC w AUTO Differential    Gastrointestinal Panel, PCR - Stool, Per Rectum      3. Primary hypothyroidism  TSH    T3, free    T3, Reverse    T4, free      4. Confusion  CBC w AUTO Differential    Vitamin B12      5. " Chronic fatigue  Comprehensive metabolic panel    CBC w AUTO Differential      6. Hyperglycemia  Comprehensive metabolic panel    Hemoglobin A1c         Assessment & Plan  1. Diarrhea.  The patient's previous lab results indicated a low levels of T4 and T3, while her T3 levels were within the normal range.  Stool studies conducted, along with a fecal fat and fecal leukocytes test. Additionally, blood work will be conducted today.     BMI is within normal parameters. No other follow-up for BMI required.     There are no Patient Instructions on file for this visit.   Follow up  recommended No follow-ups on file.   - Dragon voice recognition software was utilized to complete this chart.  Every reasonable attempt was made to edit and correct the text, however some incorrect words may remain.   John Leroy DO    Patient or patient representative verbalized consent for the use of Ambient Listening during the visit with  John Leroy DO for chart documentation. 7/7/2024  14:15 EDT

## 2024-07-08 ENCOUNTER — TELEPHONE (OUTPATIENT)
Dept: INTERNAL MEDICINE | Facility: CLINIC | Age: 74
End: 2024-07-08
Payer: MEDICARE

## 2024-07-08 NOTE — TELEPHONE ENCOUNTER
VAMSHI--Shirin  Lab calling regarding test for Fecal Leukocyte test, it was cancelled due to being in wrong container, should have been in a Grey top container.

## 2024-07-09 LAB — FUNGUS WND CULT: ABNORMAL

## 2024-07-09 RX ORDER — LIOTHYRONINE SODIUM 25 UG/1
25 TABLET ORAL 2 TIMES DAILY
Qty: 180 TABLET | Refills: 1 | Status: SHIPPED | OUTPATIENT
Start: 2024-07-09

## 2024-07-16 ENCOUNTER — OFFICE VISIT (OUTPATIENT)
Dept: FAMILY MEDICINE CLINIC | Facility: CLINIC | Age: 74
End: 2024-07-16
Payer: MEDICARE

## 2024-07-16 VITALS
WEIGHT: 118 LBS | RESPIRATION RATE: 16 BRPM | OXYGEN SATURATION: 98 % | SYSTOLIC BLOOD PRESSURE: 118 MMHG | HEIGHT: 64 IN | DIASTOLIC BLOOD PRESSURE: 72 MMHG | BODY MASS INDEX: 20.14 KG/M2 | HEART RATE: 102 BPM

## 2024-07-16 DIAGNOSIS — R53.83 FATIGUE, UNSPECIFIED TYPE: ICD-10-CM

## 2024-07-16 DIAGNOSIS — B37.9 CANDIDIASIS: Primary | ICD-10-CM

## 2024-07-16 PROCEDURE — 1159F MED LIST DOCD IN RCRD: CPT | Performed by: STUDENT IN AN ORGANIZED HEALTH CARE EDUCATION/TRAINING PROGRAM

## 2024-07-16 PROCEDURE — 99213 OFFICE O/P EST LOW 20 MIN: CPT | Performed by: STUDENT IN AN ORGANIZED HEALTH CARE EDUCATION/TRAINING PROGRAM

## 2024-07-16 PROCEDURE — 1160F RVW MEDS BY RX/DR IN RCRD: CPT | Performed by: STUDENT IN AN ORGANIZED HEALTH CARE EDUCATION/TRAINING PROGRAM

## 2024-07-16 RX ORDER — FLUCONAZOLE 100 MG/1
100 TABLET ORAL DAILY
Qty: 10 TABLET | Refills: 0 | Status: SHIPPED | OUTPATIENT
Start: 2024-07-16 | End: 2024-07-26

## 2024-07-16 RX ORDER — LEVOTHYROXINE SODIUM 25 UG/1
CAPSULE ORAL
Qty: 90 CAPSULE | Refills: 1 | Status: SHIPPED | OUTPATIENT
Start: 2024-07-16

## 2024-07-27 NOTE — PROGRESS NOTES
John Leroy DO  [facility]  [facility]  [place] [id number] [phone]    Subjective      Name Diya Zamora MRN [id number]    1950 AGE/SEX 74 y.o. / female      Chief Complaint Chief Complaint   Patient presents with    Results     Here to discuss results of fecal test         Visit History for  2024    Diya Zamora is a 74 y.o. female who presented today for Results (Here to discuss results of fecal test)     History of Present Illness  The patient presents for evaluation of multiple medical concerns.    The patient's thyroid function was found to be elevated on the day of her laboratory tests, and has been taking her thyroid medication as directed. She maintains a consistent regimen of thyroid medication, taking 25 mcg in the morning, 25 mcg at noon, and 10 mcg at 4:00 PM. Recently, she has been experiencing persistent tremors, which she attributes to stress. During a stressful period, she experienced difficulty writing, which has since improved. She denies excessive sweating.    The patient has been on a probiotic regimen for 2 months, which includes  yogurt, and kombucha. Despite this, her diarrhea has not improved. She sought urgent care and was prescribed fluconazole cream and 3 pills, to be taken every other day. During that period, her stool firmed up. Her naturopath physician recommended a stool sample from her primary care physician, suspecting candida and potential antifungal chromosome treatment. She has expressed interest in checking her copper levels.       Medications and Allergies   Current Outpatient Medications   Medication Instructions    BEE POLLEN PO Oral    Berberine Chloride (BERBERINE HCI PO) 1,000 mg, Oral, Daily    CALCIUM LACTATE  mg, Oral    clotrimazole (LOTRIMIN) 1 % cream 1 Application, Topical, 2 Times Daily    fluconazole (DIFLUCAN) 100 mg, Oral, Daily    glutamine powder 3,000 mg, Daily     "liothyronine (CYTOMEL) 10 mcg, Oral, Daily    liothyronine (CYTOMEL) 25 mcg, Oral, 2 Times Daily    Lysine 1000 MG tablet Oral    MAGNESIUM PO Oral    Medium Chain Triglycerides (MCT OIL PO) Take  by mouth. 2 TBS    Nutritional Supplements (NUTRITIONAL SUPPLEMENT PO) 500 mg    Nutritional Supplements (NUTRITIONAL SUPPLEMENT PO) 2 Times Daily    Nutritional Supplements (NUTRITIONAL SUPPLEMENT PO) 400 mg    PATIENT SUPPLIED MEDICATION PERFECT RODIALA PERRY 400 MG     PATIENT SUPPLIED MEDICATION FIQ RECUPERALE 1Q COPPER 2 MG     PATIENT SUPPLIED MEDICATION KAREN SEA MINERAL 2.0 ( 2 DROPPER )    PATIENT SUPPLIED MEDICATION DIATOMACEOUS EARTH 1 TSP    Probiotic Product (PROBIOTIC BLEND PO) Oral, 2 Times Daily, Sbulardii     Selenium 200 mg, Oral, Daily    TAURINE  mg, Oral, PURE ENCAPSULATE TAURINE     Tirosint 25 MCG capsule TAKE 1 CAPSULE(25 MCG) BY MOUTH DAILY IN THE MORNING ON AN EMPTY STOMACH    TURMERIC CURCUMIN PO Oral    vitamin C (ASCORBIC ACID) 250 mg, Oral, Daily    vitamin D3 5,000 Units, Oral, Daily    Wheat Germ Oil oil 385 mg, Oral     No Known Allergies   I have reviewed the above medications and allergies     Objective:      Vitals Vitals:    07/16/24 1102   BP: 118/72   BP Location: Right arm   Patient Position: Sitting   Cuff Size: Adult   Pulse: 102   Resp: 16   SpO2: 98%   Weight: 53.5 kg (118 lb)   Height: 162.6 cm (64\")     Body mass index is 20.25 kg/m².    Physical Exam  Vitals reviewed.   Constitutional:       General: She is not in acute distress.     Appearance: She is not ill-appearing.   Pulmonary:      Effort: Pulmonary effort is normal.   Psychiatric:         Mood and Affect: Mood normal.         Behavior: Behavior normal.         Thought Content: Thought content normal.         Judgment: Judgment normal.        Physical Exam       Results  Laboratory Studies  A1c had increased from 5.6 to 5.8. IgE and eosinophils were normal.     Assessment/Plan   Issues Addressed/ Plan   " Diagnosis Plan   1. Candidiasis  fluconazole (Diflucan) 100 MG tablet      2. Fatigue, unspecified type  Copper, Free    Copper, Free         Assessment & Plan  1. Diarrhea.  Patient stool sampling did indicate the presence of yeast.  Discussed with patient the probability that this is natural and is not causing her issues at this time.  However patient would like to proceed with treatment at this time.  The patient's kidney and liver enzymes are within the normal range. Fluconazole has been prescribed for a duration of 10 days.    She is also concerned about copper levels.  Going to check those today as well.     BMI is within normal parameters. No other follow-up for BMI required.     There are no Patient Instructions on file for this visit.   Follow up  recommended Return in about 1 month (around 8/16/2024).   - Dragon voice recognition software was utilized to complete this chart.  Every reasonable attempt was made to edit and correct the text, however some incorrect words may remain.   John Leroy DO    Patient or patient representative verbalized consent for the use of Ambient Listening during the visit with  John Leroy DO for chart documentation. 7/26/2024  23:49 EDT

## 2024-08-04 LAB — FUNGUS WND CULT: ABNORMAL

## 2024-08-07 ENCOUNTER — CLINICAL SUPPORT (OUTPATIENT)
Dept: FAMILY MEDICINE CLINIC | Facility: CLINIC | Age: 74
End: 2024-08-07
Payer: MEDICARE

## 2024-08-07 DIAGNOSIS — R53.82 CHRONIC FATIGUE: Primary | ICD-10-CM

## 2024-08-07 PROCEDURE — 36415 COLL VENOUS BLD VENIPUNCTURE: CPT | Performed by: STUDENT IN AN ORGANIZED HEALTH CARE EDUCATION/TRAINING PROGRAM

## 2024-08-07 NOTE — PROGRESS NOTES
Venipuncture Blood Specimen Collection  Venipuncture performed in left arm by Kat Carolina MA with good hemostasis. Patient tolerated the procedure well without complications.   08/07/24   Kat Carolina MA

## 2024-08-14 LAB — COPPER, FREE: 520 MCG/L

## 2024-08-15 ENCOUNTER — PATIENT MESSAGE (OUTPATIENT)
Dept: FAMILY MEDICINE CLINIC | Facility: CLINIC | Age: 74
End: 2024-08-15
Payer: MEDICARE

## 2024-08-15 ENCOUNTER — OFFICE VISIT (OUTPATIENT)
Dept: FAMILY MEDICINE CLINIC | Facility: CLINIC | Age: 74
End: 2024-08-15
Payer: MEDICARE

## 2024-08-15 VITALS
HEART RATE: 101 BPM | DIASTOLIC BLOOD PRESSURE: 78 MMHG | SYSTOLIC BLOOD PRESSURE: 142 MMHG | OXYGEN SATURATION: 98 % | HEIGHT: 64 IN | WEIGHT: 118 LBS | TEMPERATURE: 98 F | BODY MASS INDEX: 20.14 KG/M2

## 2024-08-15 DIAGNOSIS — B35.4 TINEA CORPORIS: Primary | ICD-10-CM

## 2024-08-15 PROCEDURE — 1159F MED LIST DOCD IN RCRD: CPT | Performed by: PHYSICIAN ASSISTANT

## 2024-08-15 PROCEDURE — 1160F RVW MEDS BY RX/DR IN RCRD: CPT | Performed by: PHYSICIAN ASSISTANT

## 2024-08-15 PROCEDURE — 99213 OFFICE O/P EST LOW 20 MIN: CPT | Performed by: PHYSICIAN ASSISTANT

## 2024-08-15 RX ORDER — CLOTRIMAZOLE 1 G/ML
SOLUTION TOPICAL 2 TIMES DAILY
Qty: 15 ML | Refills: 0 | Status: SHIPPED | OUTPATIENT
Start: 2024-08-15 | End: 2024-08-29

## 2024-08-15 RX ORDER — FLUCONAZOLE 100 MG/1
200 TABLET ORAL DAILY
Qty: 6 TABLET | Refills: 0 | Status: SHIPPED | OUTPATIENT
Start: 2024-08-15 | End: 2024-08-18

## 2024-08-15 NOTE — TELEPHONE ENCOUNTER
From: Diya Zamora  To: John Leroy  Sent: 8/15/2024 8:51 AM EDT  Subject: Rash and test results    I have a painful, itching rash under breasts and abdomen and am requesting a rx for it and or talk with nurse about it. Also talk about 2 lab resuts. As i don't know what they mean.Previouly had this and was rx'ed clotrimazole cream 2 x's day for 14 days and fluconazole 100 mg 1 every other day 3 tablets.  Please call [phone] as soon as possible.

## 2024-08-15 NOTE — PROGRESS NOTES
"Chief Complaint  Rash (Red itchy rash under breast and on abdomen for 3 days)    Subjective        Diya Zamora presents to [facility]  History of Present Illness    Patient is a 74-year-old female presenting with a rash under her bilateral breast for 3 days.  Patient states she has experienced this rash several times over the last few years.  Most recently was in May, she went to the urgent care and was given clotrimazole as well as fluconazole and the rash cleared up appropriately.  She states the rash is itchy and does not necessarily hurt but has more of a burning sensation.  She has been using apple cider vinegar soaks at home which has helped some.    Objective   Vital Signs:  /78   Pulse 101   Temp 98 °F (36.7 °C)   Ht 162.6 cm (64\")   Wt 53.5 kg (118 lb)   SpO2 98%   BMI 20.25 kg/m²   Estimated body mass index is 20.25 kg/m² as calculated from the following:    Height as of this encounter: 162.6 cm (64\").    Weight as of this encounter: 53.5 kg (118 lb).    BMI is within normal parameters. No other follow-up for BMI required.      Physical Exam  Constitutional:       Appearance: Normal appearance.   HENT:      Head: Normocephalic and atraumatic.   Skin:     Findings: Erythema and rash present. Rash is macular. Rash is not crusting, urticarial or vesicular.          Neurological:      Mental Status: She is alert.        Result Review :                Assessment and Plan   Diagnoses and all orders for this visit:    1. Tinea corporis (Primary)  -     clotrimazole (LOTRIMIN) 1 % external solution; Apply  topically to the appropriate area as directed 2 (Two) Times a Day for 14 days.  Dispense: 15 mL; Refill: 0  -     fluconazole (Diflucan) 100 MG tablet; Take 2 tablets by mouth Daily for 3 days.  Dispense: 6 tablet; Refill: 0             Follow Up   No follow-ups on file.  Patient was given instructions and counseling regarding her condition or for health " maintenance advice. Please see specific information pulled into the AVS if appropriate.

## 2024-08-22 ENCOUNTER — OFFICE VISIT (OUTPATIENT)
Dept: FAMILY MEDICINE CLINIC | Facility: CLINIC | Age: 74
End: 2024-08-22
Payer: MEDICARE

## 2024-08-22 VITALS
BODY MASS INDEX: 20.14 KG/M2 | SYSTOLIC BLOOD PRESSURE: 122 MMHG | DIASTOLIC BLOOD PRESSURE: 72 MMHG | HEART RATE: 115 BPM | HEIGHT: 64 IN | OXYGEN SATURATION: 96 % | WEIGHT: 118 LBS

## 2024-08-22 DIAGNOSIS — R73.03 PREDIABETES: ICD-10-CM

## 2024-08-22 DIAGNOSIS — F41.9 ANXIETY: ICD-10-CM

## 2024-08-22 DIAGNOSIS — E03.9 PRIMARY HYPOTHYROIDISM: Primary | ICD-10-CM

## 2024-08-22 PROBLEM — M79.7 FIBROMYOSITIS: Status: ACTIVE | Noted: 2019-02-21

## 2024-08-22 PROBLEM — B02.9 HERPES ZOSTER: Status: ACTIVE | Noted: 2019-02-21

## 2024-08-22 RX ORDER — LIOTHYRONINE SODIUM 25 UG/1
25 TABLET ORAL DAILY
Qty: 180 TABLET | Refills: 1 | Status: SHIPPED | OUTPATIENT
Start: 2024-08-22

## 2024-08-22 RX ORDER — LIOTHYRONINE SODIUM 5 UG/1
10 TABLET ORAL 2 TIMES DAILY
Qty: 120 TABLET | Refills: 2 | Status: SHIPPED | OUTPATIENT
Start: 2024-08-22

## 2024-08-22 RX ORDER — LEVOTHYROXINE SODIUM 50 UG/1
50 CAPSULE ORAL DAILY
Qty: 30 CAPSULE | Refills: 2 | Status: SHIPPED | OUTPATIENT
Start: 2024-08-22

## 2024-08-22 RX ORDER — LEVOTHYROXINE SODIUM 50 UG/1
50 CAPSULE ORAL DAILY
Qty: 30 CAPSULE | Refills: 2 | Status: SHIPPED | OUTPATIENT
Start: 2024-08-22 | End: 2024-08-22

## 2024-08-22 NOTE — PROGRESS NOTES
John Leroy DO  [facility]  [address]  [place] [id number] [phone]    Subjective      Name Diya Zamora MRN [id number]    1950 AGE/SEX 74 y.o. / female      Chief Complaint Chief Complaint   Patient presents with    Diarrhea    Fatigue         Visit History for  2024    Diya Zamora is a 74 y.o. female who presented today for Diarrhea and Fatigue       History of Present Illness  The patient presents for evaluation of multiple medical concerns.    She reports experiencing severe tremors, making writing difficult. She also mentions feeling weak and lethargic, with episodes of dizziness, particularly when changing positions from squatting to standing. These symptoms have been present for approximately two weeks. Additionally, she describes a sensation of her skin crawling on her outer ear, as if there were insects or water dripping, a symptom she has not experienced before. She is currently taking Tirosint 25 mcg early in the morning.    She expresses concern about her A1c levels, which have shown an increase, and is considering whether to increase her berberine dosage. She is also interested in having her calcium, iodine, and selenium levels checked. She has been making dietary changes, including reducing her sugar intake.    She has been dealing with anxiety and depression, which have been exacerbated by her current health issues. Her anxiety is particularly heightened when driving, especially when encountering oncoming traffic. She has a history of intermittent depression throughout her life.    She has been experiencing increased flatulence and burping but reports no bloating. She also reports a fungal outbreak on her abdomen and under her breasts, which was accompanied by intense itching. A few days later, she noticed non-itchy red spots on top of her breast. She has been applying Flexeril cream to the affected areas. She  "completed a 10-day course of fluconazole on 07/16/2024, during which she had four formed bowel movements. Since then, her bowel movements have varied between formed, liquid, and constipated. She is unsure if she has a fungal overgrowth and is considering whether to continue fluconazole treatment. She also reports occasional confusion and difficulty thinking clearly. She has lost a significant amount of weight, but her pulse remains normal.          Medications and Allergies   Current Outpatient Medications   Medication Instructions    BEE POLLEN PO Oral    Berberine Chloride (BERBERINE HCI PO) 1,000 mg, Oral, Daily    CALCIUM LACTATE  mg, Oral    levothyroxine sodium (TIROSINT) 50 mcg, Oral, Daily    liothyronine (CYTOMEL) 25 mcg, Oral, Daily    liothyronine (CYTOMEL) 10 mcg, Oral, 2 Times Daily    Lysine 1000 MG tablet Oral    MAGNESIUM PO Oral    Nutritional Supplements (NUTRITIONAL SUPPLEMENT PO) 500 mg, Oral, Jigsaw mag SRT B- free    Nutritional Supplements (NUTRITIONAL SUPPLEMENT PO) Oral, 2 Times Daily, Integrative therapeutics panplex 2 phase <BR>1-2 with meals     PATIENT SUPPLIED MEDICATION KAREN SEA MINERAL 2.0 ( 2 DROPPER )    Probiotic Product (PROBIOTIC BLEND PO) Oral, 2 Times Daily, Sbulardii     Selenium 200 mg, Oral, Daily    TAURINE  mg, Oral, PURE ENCAPSULATE TAURINE     TURMERIC CURCUMIN PO Oral    vitamin C (ASCORBIC ACID) 250 mg, Oral, Daily    vitamin D3 5,000 Units, Oral, Daily    Wheat Germ Oil oil 385 mg, Oral     No Known Allergies   I have reviewed the above medications and allergies     Objective:      Vitals Vitals:    08/22/24 1514   BP: 122/72   Pulse: 115   SpO2: 96%   Weight: 53.5 kg (118 lb)   Height: 162.6 cm (64.02\")     Body mass index is 20.24 kg/m².    Physical Exam  Vitals reviewed.   Constitutional:       General: She is not in acute distress.     Appearance: She is not ill-appearing.   Pulmonary:      Effort: Pulmonary effort is normal.   Psychiatric:         " Mood and Affect: Mood normal.         Behavior: Behavior normal.         Thought Content: Thought content normal.         Judgment: Judgment normal.          Physical Exam       Results  Laboratory Studies  Free copper value 520. T3 was high. A1c was 5.8.     Assessment/Plan   Issues Addressed/ Plan   Diagnosis Plan   1. Primary hypothyroidism  liothyronine (CYTOMEL) 5 MCG tablet    levothyroxine sodium (Tirosint) 50 MCG capsule      2. Prediabetes        3. Anxiety           Assessment & Plan  1. Thyroid disorder.  Her T3 levels are elevated, while T4 levels are low. The reverse T3 is also low. Symptoms of tremors and anxiety could be attributed to the high T3 levels. Blood pressure readings were within normal range. The dosage of T3 will be adjusted to 25 mcg in the morning, 10 mcg at noon, and 10 mcg in the late afternoon. The T4 dosage will be increased to 50 mcg. These changes will be implemented for a month, after which a re-evaluation will be conducted. A prescription for additional 5 mcg T3 and 50 mcg T4 will be sent to the pharmacy.    2. Prediabetes.  Her A1c level was recorded as 5.8 in 02/2024, indicating prediabetes. She was advised to continue with her dietary modifications, including reducing sugar intake. A follow-up A1c test will be scheduled in 3 months.    3. Anxiety and depression.  She reported increased anxiety and depression, especially when driving. A comprehensive approach will be taken to address her symptoms, starting with the management of her thyroid condition.    4. Fungal infection.  Her fungal culture results were discussed, and it was explained that the abnormal result could be due to normal gut leonardo. The possibility of a fungal overgrowth was also considered. The focus will initially be on managing her thyroid condition, while monitoring the fungal infection.    5. Medication Management.  She is currently taking calcium, iodine, and selenium supplements. Labs to check levels of  these supplements will be done in a month to ensure she is not taking too much.    Follow-up  A follow-up appointment is scheduled in 1 month.     BMI is within normal parameters. No other follow-up for BMI required.     There are no Patient Instructions on file for this visit.   Follow up  recommended Return if symptoms worsen or fail to improve.   - Dragon voice recognition software was utilized to complete this chart.  Every reasonable attempt was made to edit and correct the text, however some incorrect words may remain.   John Leroy DO    Patient or patient representative verbalized consent for the use of Ambient Listening during the visit with  John Leroy DO for chart documentation. 9/1/2024  22:48 EDT

## 2024-09-23 ENCOUNTER — OFFICE VISIT (OUTPATIENT)
Dept: FAMILY MEDICINE CLINIC | Facility: CLINIC | Age: 74
End: 2024-09-23
Payer: MEDICARE

## 2024-09-23 VITALS
BODY MASS INDEX: 21 KG/M2 | RESPIRATION RATE: 16 BRPM | SYSTOLIC BLOOD PRESSURE: 138 MMHG | HEIGHT: 64 IN | DIASTOLIC BLOOD PRESSURE: 66 MMHG | OXYGEN SATURATION: 98 % | HEART RATE: 96 BPM | WEIGHT: 123 LBS

## 2024-09-23 DIAGNOSIS — R73.03 PREDIABETES: ICD-10-CM

## 2024-09-23 DIAGNOSIS — E03.9 PRIMARY HYPOTHYROIDISM: Primary | ICD-10-CM

## 2024-09-23 DIAGNOSIS — R53.83 FATIGUE, UNSPECIFIED TYPE: ICD-10-CM

## 2024-09-23 DIAGNOSIS — R41.0 CONFUSION: ICD-10-CM

## 2024-09-23 NOTE — PROGRESS NOTES
John Leroy DO  [facility] PRIMARY CARE  [address]  [place] [id number] [phone]    Subjective      Name Diya Zamora MRN [id number]    1950 AGE/SEX 74 y.o. / female      Chief Complaint Chief Complaint   Patient presents with    Hypothyroidism     Check up and labs          Visit History for  2024    Diya Zamora is a 74 y.o. female who presented today for Hypothyroidism (Check up and labs )       History of Present Illness  She has resumed smoking after a period of cessation, which was initially triggered by a desire to celebrate the successful healing of an oral surgery. She has not undergone any cancer screenings or colonoscopy. She reports no presence of blood in her stool.    Since the adjustment of her thyroid medication dosage, she has experienced regular bowel movements each morning, an increase in appetite, and a weight gain of 5 pounds since her last visit. She also reports an improvement in her anxiety levels. She recalls an incident last Thursday where she may have taken an incorrect dose of her thyroid medication. She also reports that her blood sugar levels have worsened.    She continues to experience tremors, describing sensations of internal vibrations and weakness in her legs, which affect her gait. She also reports difficulty in writing due to hand tremors. Despite reducing her adrenal cortex dosage from 250 mg to 200 mg daily, she has not noticed any improvement in her tremors. She is considering further reducing the dosage by 50 mg every 2 to 3 weeks. She is currently taking two 500 mg doses of Berberine daily and is contemplating increasing the dosage. She also takes a probiotic three times a day and is considering switching to a spore-based probiotic.    She reports experiencing headaches upon waking up each morning. She experiences leg weakness and internal vibrations, and had an episode of severe leg jitteriness while  "drinking coffee this morning. She also reports heat intolerance, which causes nausea when she tries to work in her garden.           Medications and Allergies   Current Outpatient Medications   Medication Instructions    BEE POLLEN PO Oral    Berberine Chloride (BERBERINE HCI PO) 1,000 mg, Oral, Daily    CALCIUM LACTATE  mg, Oral    levothyroxine sodium (TIROSINT) 50 mcg, Oral, Daily    liothyronine (CYTOMEL) 25 mcg, Oral, Daily    liothyronine (CYTOMEL) 10 mcg, Oral, 2 Times Daily    Lysine 1000 MG tablet Oral    MAGNESIUM PO Oral    Nutritional Supplements (NUTRITIONAL SUPPLEMENT PO) 500 mg, Oral, [employer] SRT B- free    Nutritional Supplements (NUTRITIONAL SUPPLEMENT PO) Oral, 2 Times Daily, [employer] panplex 2 phase <BR>1-2 with meals     PATIENT SUPPLIED MEDICATION KAREN SEA MINERAL 2.0 ( 2 DROPPER )    Probiotic Product (PROBIOTIC BLEND PO) Oral, 2 Times Daily, Sbulardii     Selenium 200 mg, Oral, Daily    TAURINE  mg, Oral, PURE ENCAPSULATE TAURINE     TURMERIC CURCUMIN PO Oral    vitamin C (ASCORBIC ACID) 250 mg, Oral, Daily    vitamin D3 5,000 Units, Oral, Daily    Wheat Germ Oil oil 385 mg, Oral     No Known Allergies   I have reviewed the above medications and allergies     Objective:      Vitals Vitals:    09/23/24 1255   BP: 138/66   BP Location: Left arm   Patient Position: Sitting   Cuff Size: Adult   Pulse: 96   Resp: 16   SpO2: 98%   Weight: 55.8 kg (123 lb)   Height: 162.6 cm (64.02\")     Body mass index is 21.1 kg/m².    Physical Exam  Vitals reviewed.   Constitutional:       General: She is not in acute distress.     Appearance: She is not ill-appearing.   Pulmonary:      Effort: Pulmonary effort is normal.   Psychiatric:         Mood and Affect: Mood normal.         Behavior: Behavior normal.         Thought Content: Thought content normal.         Judgment: Judgment normal.          Physical Exam       Results       Assessment/Plan   Issues Addressed/ Plan   " Diagnosis Plan   1. Primary hypothyroidism  TSH    T4, free    T3, free    T3, Reverse      2. Prediabetes        3. Fatigue, unspecified type  Magnesium    Iodine, Serum or Plasma    Selenium Serum    Vitamin B12    Folate    Comprehensive metabolic panel    CBC w AUTO Differential      4. Confusion  Magnesium    Iodine, Serum or Plasma    Selenium Serum    Vitamin B12    Folate    Comprehensive metabolic panel    CBC w AUTO Differential         Assessment & Plan  1. Routine Follow-Up.  Thyroid levels will be checked today, including free T3, free T4, and reverse T3. She was advised to continue her current medications and to take the probiotic twice daily instead of three times daily.     2. Tremors.  The tremors could be indicative of Parkinson's disease, essential tremor, or multiple sclerosis (MS), though MS is less likely due to her age. The tremors could also be a result of excessive T3 intake. Thyroid levels will be checked today to assess this possibility. If thyroid levels are normal, further evaluation for Parkinson's disease or essential tremor may be considered.     3. Smoking.  She has resumed smoking for the past couple of months. Discussed the importance of smoking cessation and potential lung cancer screening. She was advised to consider quitting smoking again.    4. Weight Gain and Appetite.  She has gained 5 pounds and her appetite has increased since the last visit. This is a positive development, and she should continue her current regimen.    5. Heat Intolerance.  She reports new onset heat intolerance, which could be related to thyroid issues. Thyroid levels will be checked today to assess this possibility.    6. Headaches.  She reports experiencing headaches every morning. This will be monitored, and further evaluation may be needed if symptoms persist.    7. Health Maintenance.  Discussed the potential benefit of a colonoscopy given her age. She was informed that if she undergoes a  colonoscopy now, she may not need another one until she is 84, unless something is found. Discussed the option of lung cancer screening due to her smoking history.       BMI is within normal parameters. No other follow-up for BMI required.     There are no Patient Instructions on file for this visit.   Follow up  recommended Return in about 3 months (around 12/23/2024).   - Dragon voice recognition software was utilized to complete this chart.  Every reasonable attempt was made to edit and correct the text, however some incorrect words may remain.   John Leroy DO    Patient or patient representative verbalized consent for the use of Ambient Listening during the visit with  John Leroy DO for chart documentation. 10/6/2024  22:20 EDT

## 2024-10-02 LAB
ALBUMIN SERPL-MCNC: 4.7 G/DL (ref 3.8–4.8)
ALP SERPL-CCNC: 116 IU/L (ref 44–121)
ALT SERPL-CCNC: 16 IU/L (ref 0–32)
AST SERPL-CCNC: 18 IU/L (ref 0–40)
BASOPHILS # BLD AUTO: 0.1 X10E3/UL (ref 0–0.2)
BASOPHILS NFR BLD AUTO: 1 %
BILIRUB SERPL-MCNC: 0.3 MG/DL (ref 0–1.2)
BUN SERPL-MCNC: 10 MG/DL (ref 8–27)
BUN/CREAT SERPL: 17 (ref 12–28)
CALCIUM SERPL-MCNC: 10 MG/DL (ref 8.7–10.3)
CHLORIDE SERPL-SCNC: 107 MMOL/L (ref 96–106)
CO2 SERPL-SCNC: 21 MMOL/L (ref 20–29)
CREAT SERPL-MCNC: 0.6 MG/DL (ref 0.57–1)
EGFRCR SERPLBLD CKD-EPI 2021: 94 ML/MIN/1.73
EOSINOPHIL # BLD AUTO: 0.1 X10E3/UL (ref 0–0.4)
EOSINOPHIL NFR BLD AUTO: 1 %
ERYTHROCYTE [DISTWIDTH] IN BLOOD BY AUTOMATED COUNT: 12.6 % (ref 11.7–15.4)
FOLATE SERPL-MCNC: 15.8 NG/ML
GLOBULIN SER CALC-MCNC: 2.3 G/DL (ref 1.5–4.5)
GLUCOSE SERPL-MCNC: 92 MG/DL (ref 70–99)
HCT VFR BLD AUTO: 43.9 % (ref 34–46.6)
HGB BLD-MCNC: 13.6 G/DL (ref 11.1–15.9)
IMM GRANULOCYTES # BLD AUTO: 0 X10E3/UL (ref 0–0.1)
IMM GRANULOCYTES NFR BLD AUTO: 0 %
IODINE SERPL-MCNC: 638 UG/L (ref 40–92)
LYMPHOCYTES # BLD AUTO: 2.8 X10E3/UL (ref 0.7–3.1)
LYMPHOCYTES NFR BLD AUTO: 39 %
MAGNESIUM SERPL-MCNC: 2.1 MG/DL (ref 1.6–2.3)
MCH RBC QN AUTO: 27.3 PG (ref 26.6–33)
MCHC RBC AUTO-ENTMCNC: 31 G/DL (ref 31.5–35.7)
MCV RBC AUTO: 88 FL (ref 79–97)
MONOCYTES # BLD AUTO: 0.6 X10E3/UL (ref 0.1–0.9)
MONOCYTES NFR BLD AUTO: 8 %
NEUTROPHILS # BLD AUTO: 3.7 X10E3/UL (ref 1.4–7)
NEUTROPHILS NFR BLD AUTO: 51 %
PLATELET # BLD AUTO: 255 X10E3/UL (ref 150–450)
POTASSIUM SERPL-SCNC: 5 MMOL/L (ref 3.5–5.2)
PROT SERPL-MCNC: 7 G/DL (ref 6–8.5)
RBC # BLD AUTO: 4.99 X10E6/UL (ref 3.77–5.28)
SELENIUM SERPL-MCNC: 366 UG/L (ref 93–198)
SODIUM SERPL-SCNC: 144 MMOL/L (ref 134–144)
T3FREE SERPL-MCNC: 3.9 PG/ML (ref 2–4.4)
T3REVERSE SERPL-MCNC: 15.3 NG/DL (ref 9.2–24.1)
T4 FREE SERPL-MCNC: 0.79 NG/DL (ref 0.82–1.77)
TSH SERPL DL<=0.005 MIU/L-ACNC: <0.005 UIU/ML (ref 0.45–4.5)
VIT B12 SERPL-MCNC: 338 PG/ML (ref 232–1245)
WBC # BLD AUTO: 7.3 X10E3/UL (ref 3.4–10.8)

## 2024-10-08 ENCOUNTER — TELEPHONE (OUTPATIENT)
Dept: FAMILY MEDICINE CLINIC | Facility: CLINIC | Age: 74
End: 2024-10-08
Payer: MEDICARE

## 2024-10-08 NOTE — TELEPHONE ENCOUNTER
Caller: Diya Zamora    Relationship: Self    Best call back number: [phone]    What is the best time to reach you: TODAY 10-8-24    Who are you requesting to speak with (clinical staff, provider,  specific staff member): DR SANABRIA      What was the call regarding: PATIENT WANTS TO DISCUSS LABS WITH DR SANABRIA.  PATIENT ASKS THAT DR SANABRIA CALL TODAY 10-8-24     Called and spoke with patient and she would like the paperwork faxed

## 2024-10-09 RX ORDER — LIOTHYRONINE SODIUM 25 UG/1
25 TABLET ORAL 2 TIMES DAILY
Qty: 180 TABLET | Refills: 1 | Status: SHIPPED | OUTPATIENT
Start: 2024-10-09

## 2024-10-25 ENCOUNTER — TELEPHONE (OUTPATIENT)
Dept: FAMILY MEDICINE CLINIC | Facility: CLINIC | Age: 74
End: 2024-10-25
Payer: MEDICARE

## 2024-10-25 NOTE — TELEPHONE ENCOUNTER
Caller: Diya Zamora    Relationship: Self    Best call back number: [phone]     Who are you requesting to speak with (clinical staff, provider,  specific staff member): DR SANABRIA OR CHRISTINA    What was the call regarding: PATIENT REQUESTS A CALL BACK TO FOLLOW UP ON PREVIOUS MESSAGE REGARDING LABS

## 2024-10-30 ENCOUNTER — TELEPHONE (OUTPATIENT)
Dept: FAMILY MEDICINE CLINIC | Facility: CLINIC | Age: 74
End: 2024-10-30
Payer: MEDICARE

## 2024-10-30 NOTE — TELEPHONE ENCOUNTER
Caller: Diya Zamora    Relationship: Self    Best call back number: [phone]         What was the call regarding:     PATIENT IS NEEDING A CALL BACK FROM DR SANABRIA OR CHRISTINA    THIS IS REGARDING A MEDICATION DOSAGE CHANGE.  THE   MEDICATION IS liothyronine AND levothyroxine sodium (Tirosint)       PATIENT WOULD LIKE A CALL BACK TODAY IF POSSIBLE

## 2024-10-30 NOTE — TELEPHONE ENCOUNTER
Patient states she thinks she needs to make adjustments on her thyroid mediations. She has been having worsening symptoms of fatigue, tremors, weakness and daily headaches.    She is currently taking Liothyronine   25 mcg every AM  10 mcg noon daily  10 mcg every PM    She would like to increase this by 5 mcg.      She is also taking levothyroxine   50 mcg every AM     She would like to decrease 25 mcg and a 12.5 mcg.     She thinks her T3 is higher than it should be and doesn't feel like she can wait until her appointment to adjust medication. Please advise

## 2024-10-31 ENCOUNTER — TELEPHONE (OUTPATIENT)
Dept: FAMILY MEDICINE CLINIC | Facility: CLINIC | Age: 74
End: 2024-10-31
Payer: MEDICARE

## 2024-10-31 DIAGNOSIS — R73.03 PREDIABETES: ICD-10-CM

## 2024-10-31 DIAGNOSIS — E03.9 PRIMARY HYPOTHYROIDISM: Primary | ICD-10-CM

## 2024-10-31 NOTE — TELEPHONE ENCOUNTER
Caller: Diya Zamora    Relationship: Self    Best call back number: [phone]     What is the best time to reach you: ANY    Who are you requesting to speak with (clinical staff, provider,  specific staff member): NAVEED    What was the call regarding: PATIENT WOULD LIKE TO KNOW IF LABS NEED TO BE SCHEDULED BEFORE HER APPT 11/11

## 2024-10-31 NOTE — TELEPHONE ENCOUNTER
We would need to repeat her labs before we adjust the medication.  It is based off of blood levels as well as symptoms.  We would need to correlate that it is the blood levels along with symptoms before adjustment.

## 2024-11-04 ENCOUNTER — CLINICAL SUPPORT (OUTPATIENT)
Dept: FAMILY MEDICINE CLINIC | Facility: CLINIC | Age: 74
End: 2024-11-04
Payer: MEDICARE

## 2024-11-04 DIAGNOSIS — E03.9 PRIMARY HYPOTHYROIDISM: ICD-10-CM

## 2024-11-04 DIAGNOSIS — R73.03 PREDIABETES: ICD-10-CM

## 2024-11-04 PROCEDURE — 36415 COLL VENOUS BLD VENIPUNCTURE: CPT | Performed by: STUDENT IN AN ORGANIZED HEALTH CARE EDUCATION/TRAINING PROGRAM

## 2024-11-04 NOTE — PROGRESS NOTES
Venipuncture Blood Specimen Collection  Venipuncture performed in left arm by Kat Carolina MA with good hemostasis. Patient tolerated the procedure well without complications.   11/04/24   Kat Carolina MA

## 2024-11-07 LAB
HBA1C MFR BLD: 5.5 % (ref 4.8–5.6)
T3FREE SERPL-MCNC: 4.3 PG/ML (ref 2–4.4)
T3REVERSE SERPL-MCNC: 13.3 NG/DL (ref 9.2–24.1)
T4 FREE SERPL-MCNC: 0.81 NG/DL (ref 0.92–1.68)
TSH SERPL DL<=0.005 MIU/L-ACNC: <0.005 UIU/ML (ref 0.27–4.2)

## 2024-11-11 ENCOUNTER — OFFICE VISIT (OUTPATIENT)
Dept: FAMILY MEDICINE CLINIC | Facility: CLINIC | Age: 74
End: 2024-11-11
Payer: MEDICARE

## 2024-11-11 VITALS
SYSTOLIC BLOOD PRESSURE: 108 MMHG | WEIGHT: 125 LBS | HEIGHT: 64 IN | RESPIRATION RATE: 18 BRPM | HEART RATE: 96 BPM | DIASTOLIC BLOOD PRESSURE: 64 MMHG | BODY MASS INDEX: 21.34 KG/M2 | OXYGEN SATURATION: 97 %

## 2024-11-11 DIAGNOSIS — E03.9 PRIMARY HYPOTHYROIDISM: ICD-10-CM

## 2024-11-11 PROCEDURE — 99214 OFFICE O/P EST MOD 30 MIN: CPT | Performed by: STUDENT IN AN ORGANIZED HEALTH CARE EDUCATION/TRAINING PROGRAM

## 2024-11-11 RX ORDER — LEVOTHYROXINE SODIUM 13 UG/1
13 CAPSULE ORAL DAILY
Qty: 30 CAPSULE | Refills: 3 | Status: SHIPPED | OUTPATIENT
Start: 2024-11-11

## 2024-11-11 RX ORDER — LIOTHYRONINE SODIUM 25 UG/1
25 TABLET ORAL DAILY
Qty: 180 TABLET | Refills: 1 | Status: SHIPPED | OUTPATIENT
Start: 2024-11-11 | End: 2024-11-11 | Stop reason: SDUPTHER

## 2024-11-11 RX ORDER — LIOTHYRONINE SODIUM 25 UG/1
25 TABLET ORAL DAILY
Qty: 180 TABLET | Refills: 1 | Status: SHIPPED | OUTPATIENT
Start: 2024-11-11

## 2024-11-11 RX ORDER — LEVOTHYROXINE SODIUM 13 UG/1
13 CAPSULE ORAL DAILY
Qty: 30 CAPSULE | Refills: 3 | Status: SHIPPED | OUTPATIENT
Start: 2024-11-11 | End: 2024-11-11 | Stop reason: SDUPTHER

## 2024-11-11 RX ORDER — LEVOTHYROXINE SODIUM 25 UG/1
25 CAPSULE ORAL DAILY
Qty: 30 CAPSULE | Refills: 2 | Status: SHIPPED | OUTPATIENT
Start: 2024-11-11

## 2024-11-11 RX ORDER — LEVOTHYROXINE SODIUM 25 UG/1
25 CAPSULE ORAL DAILY
Qty: 30 CAPSULE | Refills: 2 | Status: SHIPPED | OUTPATIENT
Start: 2024-11-11 | End: 2024-11-11 | Stop reason: SDUPTHER

## 2024-11-11 RX ORDER — LIOTHYRONINE SODIUM 5 UG/1
TABLET ORAL
Qty: 120 TABLET | Refills: 2 | Status: SHIPPED | OUTPATIENT
Start: 2024-11-11

## 2024-11-11 NOTE — PROGRESS NOTES
John Leroy,   [facility] PRIMARY CARE  [address]  [place] [id number] [phone]    Subjective      Name Diya Zamora MRN [id number]    1950 AGE/SEX 74 y.o. / female      Chief Complaint Chief Complaint   Patient presents with    Hypothyroidism     Would like to discuss results and changing medication. Having issues with tremors, balance issues, fatigue, breathing, memory, headaches, hair loss and  / writing.          Visit History for  2024    Diya Zamora is a 74 y.o. female who presented today for Hypothyroidism (Would like to discuss results and changing medication. Having issues with tremors, balance issues, fatigue, breathing, memory, headaches, hair loss and  / writing. )       History of Present Illness  The patient presents for evaluation of multiple medical concerns.    She reports an increase in her reverse T3 levels after a change in medication on 2024. She has been monitoring this for years and has stopped her thyroid medication. Her current regimen includes T3 medication at doses of 25, 10, and 10, totaling 45, and T4 medication at 50. She is considering reducing the T4 dosage to 25 plus 12.5. Recent lab results show free T3 at 4.3, free T4 at 0.81, and reverse T3 at 13.3. She notes that when her free T4 was high, it did not convert and her reverse T3 increased significantly. She is concerned about potential side effects of primidone, as she already experiences fatigue.    She continues to experience tremors, which have made writing difficult. She also reports feeling fatigued, weak, heavy, wobbly, and having rubbery legs. She has noticed an increase in hair loss and occasional shallow breathing.    She experiences headaches almost daily, which she manages with ibuprofen and coffee. She has also been working on her neck muscles to alleviate severe headaches. She sleeps on her back and side, and gets 9 to 10 hours of  "sleep each night.    She has been less active over the past 4 years, with gardening being her primary form of exercise. She has also experienced some cognitive issues and wonders if these symptoms could be related to long COVID-19.       Medications and Allergies   Current Outpatient Medications   Medication Instructions    ADRENAL CORTEX  mg, Daily    BEE POLLEN PO Take  by mouth.    Berberine Chloride (BERBERINE HCI PO) 1,000 mg, Daily    CALCIUM LACTATE  mg    levothyroxine sodium (TIROSINT) 25 mcg, Oral, Daily    liothyronine (CYTOMEL) 10 mcg, Oral, 2 Times Daily    liothyronine (CYTOMEL) 25 mcg, Oral, 2 Times Daily    Lysine 1000 MG tablet Take  by mouth.    MAGNESIUM PO Take  by mouth.    Nutritional Supplements (NUTRITIONAL SUPPLEMENT PO) 500 mg    Nutritional Supplements (NUTRITIONAL SUPPLEMENT PO) 2 Times Daily    PATIENT SUPPLIED MEDICATION KAREN SEA MINERAL 2.0 ( 2 DROPPER )    Probiotic Product (PROBIOTIC BLEND PO) 2 Times Daily    Selenium 200 mg, Daily    TAURINE  mg    TURMERIC CURCUMIN PO Take  by mouth.    vitamin C (ASCORBIC ACID) 250 mg, Daily    vitamin D3 5,000 Units, Daily    Wheat Germ Oil oil 385 mg     No Known Allergies   I have reviewed the above medications and allergies     Objective:      Vitals Vitals:    11/11/24 1112   BP: 108/64   BP Location: Left arm   Patient Position: Sitting   Cuff Size: Adult   Pulse: 96   Resp: 18   SpO2: 97%   Weight: 56.7 kg (125 lb)   Height: 162.6 cm (64.02\")     Body mass index is 21.44 kg/m².    Physical Exam  Vitals reviewed.   Constitutional:       General: She is not in acute distress.     Appearance: She is not ill-appearing.   Pulmonary:      Effort: Pulmonary effort is normal.   Psychiatric:         Mood and Affect: Mood normal.         Behavior: Behavior normal.         Thought Content: Thought content normal.         Judgment: Judgment normal.          Physical Exam       Results  Laboratory Studies  Free T3 is 4.3, free T4 " is 0.81, reverse T3 is 13.3.     Assessment/Plan   Issues Addressed/ Plan   Diagnosis Plan   1. Primary hypothyroidism           Assessment & Plan  1. Hypothyroidism.  Her current labs show free T3 at 4.3, free T4 at 0.81, and reverse T3 at 13.3. She is experiencing symptoms such as fatigue, weakness, hair loss, and tremors, which may be related to her thyroid levels. The T4 dosage will be adjusted to 25 mcg. Additionally, the T3 dosage will be adjusted to 25 mcg in the morning, 15 mcg at lunch, and 10 mcg in the late afternoon. She is advised to monitor her symptoms and report any changes. If symptoms persist, further adjustments may be needed.    2. Essential Tremor.  The hand tremor is likely due to essential tremor, possibly exacerbated by deconditioning and muscle mass loss from inactivity. She is advised to increase her physical activity, including walking for 15-30 minutes daily and using 5-pound weights for strength exercises. If the tremor becomes more bothersome, primidone will be considered. She is also advised to look up information on primidone and contact the office if she decides to try it.    3. Sleep Hypoxia.  Her morning headaches may be due to sleep hypoxia, as her CO2 levels increase during sleep. She is advised to practice deep breathing exercises before bedtime and upon waking. If headaches persist, a sleep study may be considered.    4. Hair Loss.  The hair loss could be associated with elevated reverse T3 levels. The adjustment in thyroid medication may help improve this symptom. She is advised to monitor her hair loss and report any changes.    Follow-up  Patient return in 6 weeks for follow up with labs.     BMI is within normal parameters. No other follow-up for BMI required.     There are no Patient Instructions on file for this visit.   Follow up  recommended Return in about 1 month (around 12/11/2024).   - Dragon voice recognition software was utilized to complete this chart.  Every  reasonable attempt was made to edit and correct the text, however some incorrect words may remain.   John Leroy DO    Patient or patient representative verbalized consent for the use of Ambient Listening during the visit with  John Leroy DO for chart documentation. 11/11/2024  13:44 EST

## 2024-11-11 NOTE — ADDENDUM NOTE
Addended by: JOSE SANABRIA on: 11/11/2024 01:50 PM     Modules accepted: Orders     New to examiner.  Patient reports that she sleeps on average 9-11 hours a night but still feels tired in the majority of time.  Recently traveled to [place] and shared a hotel room, she was told that she snores so loudly that it sound like a dirt bike and then would frequently gasp and make sounds like paper ripping.  The patient lives alone, so she has not been told this before.  She has been experiencing headaches that she initially thought was due to neck pain, so she went back to yoga.  She does not monitor her blood pressure, but does note weight gain.  Requested referral to sleep medicine for sleep study.

## 2024-11-12 RX ORDER — LIOTHYRONINE SODIUM 5 UG/1
TABLET ORAL
Qty: 450 TABLET | OUTPATIENT
Start: 2024-11-12

## 2024-11-26 DIAGNOSIS — E03.9 PRIMARY HYPOTHYROIDISM: ICD-10-CM

## 2024-11-27 RX ORDER — LEVOTHYROXINE SODIUM 50 UG/1
50 CAPSULE ORAL DAILY
Qty: 30 CAPSULE | Refills: 2 | OUTPATIENT
Start: 2024-11-27

## 2024-11-27 NOTE — TELEPHONE ENCOUNTER
Rx Refill Note  Requested Prescriptions     Pending Prescriptions Disp Refills    Tirosint 50 MCG capsule [Pharmacy Med Name: TIROSINT 50MCG CAPSULES] 30 capsule 2     Sig: TAKE 1 CAPSULE BY MOUTH DAILY      Last office visit with prescribing clinician: 11/11/2024   Last telemedicine visit with prescribing clinician: Visit date not found   Next office visit with prescribing clinician: 12/23/2024   {

## 2024-12-12 ENCOUNTER — TELEPHONE (OUTPATIENT)
Dept: FAMILY MEDICINE CLINIC | Facility: CLINIC | Age: 74
End: 2024-12-12
Payer: MEDICARE

## 2024-12-12 NOTE — TELEPHONE ENCOUNTER
Caller: Diya Zamora    Relationship: Self    Best call back number: [phone]     What was the call regarding:   PATIENT STATES THAT SHE ATTEMPTED TO SEND A STUDY TO DR SANABRIA BUT HAD ISSUES WITH [employer]. THE STUDY WAS ABOUT INTERNAL TREMORS AND VIBRATIONS RELATED TO LONG COVID, PUBLISHED BY [employer]. SHE WANTED TO KNOW IF DR SANABRIA COULD REVIEW THE ARTICLE TO DISCUSS DURING HER NEXT VISIT. PLEASE CALL TO FOLLOW UP

## 2024-12-12 NOTE — TELEPHONE ENCOUNTER
Caller: Diya Zamora     Relationship: Self     Best call back number: [phone]      What was the call regarding:   PATIENT STATES THAT SHE ATTEMPTED TO SEND A STUDY TO DR SANABRIA BUT HAD ISSUES WITH [employer]. THE STUDY WAS ABOUT THE USE OF LOW DOSE NALTREXONE FOR INTERNAL TREMORS AND VIBRATIONS RELATED TO LONG COVID, PUBLISHED BY [employer]. SHE WANTED TO KNOW IF DR SANABRIA COULD REVIEW THE ARTICLE PRIOR TO HER NEXT VISIT, TO BE ABLE TO DISCUSS. PLEASE CALL TO FOLLOW UP

## 2024-12-17 ENCOUNTER — CLINICAL SUPPORT (OUTPATIENT)
Dept: FAMILY MEDICINE CLINIC | Facility: CLINIC | Age: 74
End: 2024-12-17
Payer: MEDICARE

## 2024-12-17 DIAGNOSIS — E03.9 PRIMARY HYPOTHYROIDISM: Primary | ICD-10-CM

## 2024-12-17 PROCEDURE — 36415 COLL VENOUS BLD VENIPUNCTURE: CPT | Performed by: STUDENT IN AN ORGANIZED HEALTH CARE EDUCATION/TRAINING PROGRAM

## 2024-12-17 NOTE — PROGRESS NOTES
Venipuncture Blood Specimen Collection  Venipuncture performed in left arm by Kat Carolina MA with good hemostasis. Patient tolerated the procedure well without complications.   12/17/24   Kat Carolina MA

## 2024-12-18 LAB
T4 FREE SERPL-MCNC: 0.59 NG/DL (ref 0.92–1.68)
TSH SERPL DL<=0.005 MIU/L-ACNC: <0.005 UIU/ML (ref 0.27–4.2)

## 2024-12-23 ENCOUNTER — OFFICE VISIT (OUTPATIENT)
Dept: FAMILY MEDICINE CLINIC | Facility: CLINIC | Age: 74
End: 2024-12-23
Payer: MEDICARE

## 2024-12-23 VITALS
RESPIRATION RATE: 18 BRPM | HEART RATE: 99 BPM | BODY MASS INDEX: 21.34 KG/M2 | WEIGHT: 125 LBS | OXYGEN SATURATION: 97 % | HEIGHT: 64 IN | DIASTOLIC BLOOD PRESSURE: 78 MMHG | SYSTOLIC BLOOD PRESSURE: 132 MMHG

## 2024-12-23 DIAGNOSIS — D22.9 ATYPICAL NEVI: Primary | ICD-10-CM

## 2024-12-23 DIAGNOSIS — L98.9 SKIN LESION: ICD-10-CM

## 2024-12-23 PROCEDURE — 1160F RVW MEDS BY RX/DR IN RCRD: CPT | Performed by: STUDENT IN AN ORGANIZED HEALTH CARE EDUCATION/TRAINING PROGRAM

## 2024-12-23 PROCEDURE — 99214 OFFICE O/P EST MOD 30 MIN: CPT | Performed by: STUDENT IN AN ORGANIZED HEALTH CARE EDUCATION/TRAINING PROGRAM

## 2024-12-23 PROCEDURE — 1159F MED LIST DOCD IN RCRD: CPT | Performed by: STUDENT IN AN ORGANIZED HEALTH CARE EDUCATION/TRAINING PROGRAM

## 2024-12-24 ENCOUNTER — PATIENT MESSAGE (OUTPATIENT)
Dept: FAMILY MEDICINE CLINIC | Facility: CLINIC | Age: 74
End: 2024-12-24
Payer: MEDICARE

## 2024-12-26 ENCOUNTER — TELEPHONE (OUTPATIENT)
Dept: FAMILY MEDICINE CLINIC | Facility: CLINIC | Age: 74
End: 2024-12-26
Payer: MEDICARE

## 2024-12-26 NOTE — TELEPHONE ENCOUNTER
Diya Rosalesk [facility] Clinical Pool (supporting John Leroy, DO)2 days ago       I' m sorry but the more I search the more confused I get between all of the differences. Hemp seed oil, CBD gummies etc. Could you please spell it out for me specifically. Thank you!       Diya Campos [facility] Clinical Pool (supporting John Leroy, )2 days ago       Am I to get full spectrum, broad spectrum or isolate CBD gummies please?

## 2024-12-27 NOTE — TELEPHONE ENCOUNTER
You really want to use the isolate to start.  This is a more pure form of CBD and sometime the full spectrum can contain other things, like small amounts of THC.  If we really want to test if this is going to work for you, you might want to start with the isolate.  CBD Gummies can be more convenient, and they can sometimes have a more correct dose.  The oils are sometimes not as accurate.

## 2024-12-27 NOTE — TELEPHONE ENCOUNTER
I can only find 15mg CBD isolate gummies and have ordered some.  What dosage do you suggest i start with and then how much and when to increase?  Thanks!

## 2024-12-28 LAB
T3FREE SERPL-MCNC: 4.4 PG/ML (ref 2–4.4)
T3REVERSE SERPL-MCNC: 9.5 NG/DL (ref 9.2–24.1)
WRITTEN AUTHORIZATION: NORMAL

## 2025-01-03 DIAGNOSIS — E03.9 PRIMARY HYPOTHYROIDISM: ICD-10-CM

## 2025-01-03 RX ORDER — LEVOTHYROXINE SODIUM 25 UG/1
25 CAPSULE ORAL DAILY
Qty: 30 CAPSULE | Refills: 2 | Status: SHIPPED | OUTPATIENT
Start: 2025-01-03

## 2025-01-12 NOTE — PROGRESS NOTES
John Leroy DO  [facility]  [address]  [place] [id number] [phone]    Subjective      Name Diya Zamora MRN [id number]    1950 AGE/SEX 74 y.o. / female      Chief Complaint Chief Complaint   Patient presents with    Hypothyroidism     Follow up     Skin Problem     Has a spot on left on neck that she is concerned about     Tremors     Would like to discuss issues with tremors         Visit History for  2024    Diya Zamora is a 74 y.o. female who presented today for Hypothyroidism (Follow up ), Skin Problem (Has a spot on left on neck that she is concerned about ), and Tremors (Would like to discuss issues with tremors)       History of Present Illness     She reports a general improvement in her condition; however, the tremors persist, causing significant distress. She describes an internal sensation of shaking, which is more pronounced in her right hand than her left. These tremors interfere with daily activities such as cooking and brushing her teeth. She has not yet tried CBD oil and is seeking information on its potential benefits and side effects. She recalls a previous adverse reaction to marijuana, which included nausea, vertigo, and syncope. She is also curious about the possibility of her tremors being indicative of Parkinson's disease or multiple sclerosis, and whether a neurological consultation or MRI would be beneficial. Additionally, she inquires about the potential impact of primidone on her thyroid medication dosage. She has been using two 5-pound hand weights for exercise and is interested in understanding their potential benefits for arm strengthening.    She has been picking at a skin tag, which she initially mistook for a scab. She is considering whether to seek dermatological advice for this issue. She also mentions a small bump that has been present for several years, which she occasionally picks at. The size  "of this bump fluctuates, currently being smaller than usual. She had a cutaneous horn on her chest, which was previously treated with laser therapy.    She did not take her thyroid medication today due to a misunderstanding about the need for repeat lab work. She usually takes her last dose at 4:30 PM and is wondering if she should take her second dose at 4:30 PM and then a later one.         Medications and Allergies   Current Outpatient Medications   Medication Instructions    ADRENAL CORTEX  mg, Daily    BEE POLLEN PO Take  by mouth.    Berberine Chloride (BERBERINE HCI PO) 1,000 mg, Daily    CALCIUM LACTATE  mg    levothyroxine sodium (TIROSINT) 13 mcg, Oral, Daily    liothyronine (CYTOMEL) 5 MCG tablet Take 3 tablets by mouth Daily With Lunch AND 2 tablets Daily With Dinner.    liothyronine (CYTOMEL) 25 mcg, Oral, Daily    Lysine 1000 MG tablet Take  by mouth.    MAGNESIUM PO Take  by mouth.    Nutritional Supplements (NUTRITIONAL SUPPLEMENT PO) 500 mg    Nutritional Supplements (NUTRITIONAL SUPPLEMENT PO) 2 Times Daily    PATIENT SUPPLIED MEDICATION KAREN SEA MINERAL 2.0 ( 2 DROPPER )    Probiotic Product (PROBIOTIC BLEND PO) 2 Times Daily    Selenium 200 mg, Daily    TAURINE  mg    Tirosint 25 mcg, Oral, Daily    TURMERIC CURCUMIN PO Take  by mouth.    vitamin C (ASCORBIC ACID) 250 mg, Daily    vitamin D3 5,000 Units, Daily    Wheat Germ Oil oil 385 mg     No Known Allergies   I have reviewed the above medications and allergies     Objective:      Vitals Vitals:    12/23/24 1255   BP: 132/78   BP Location: Left arm   Patient Position: Sitting   Cuff Size: Adult   Pulse: 99   Resp: 18   SpO2: 97%   Weight: 56.7 kg (125 lb)   Height: 162.6 cm (64.02\")     Body mass index is 21.44 kg/m².    Physical Exam  Vitals reviewed.   Constitutional:       General: She is not in acute distress.     Appearance: She is not ill-appearing.   Pulmonary:      Effort: Pulmonary effort is normal. "   Psychiatric:         Mood and Affect: Mood normal.         Behavior: Behavior normal.         Thought Content: Thought content normal.         Judgment: Judgment normal.          Physical Exam       Results       Assessment/Plan   Issues Addressed/ Plan   Diagnosis Plan   1. Atypical nevi  Ambulatory Referral to Dermatology      2. Skin lesion           Assessment & Plan  1. Tremors.  The tremors do not appear to be indicative of Parkinson's disease or multiple sclerosis at this time. Symptoms are more consistent with intention tremors rather than resting tremors. A comprehensive discussion was held regarding the potential benefits and drawbacks of primidone, low-dose naltrexone, and CBD oil. She was advised to commence a trial of CBD gummies, starting with a dosage of 10 mg, and gradually increasing the dose by 5 to 10 mg every 2 days, up to a maximum of 50 mg. If there is no noticeable improvement at the 50 mg dosage, she will consult with me regarding the initiation of low-dose naltrexone.  If she experiences difficulty with fine motor tasks such as writing, cooking, or brushing her teeth, or if she begins to drop objects or has trouble walking, a referral to a neurologist and an MRI may be considered.    2. Skin tag.  The skin tag appears to be slightly scarred due to picking. It is uncertain whether it is a cutaneous horn or not. She was advised to consult a dermatologist for further evaluation and potential removal of the skin tag. A referral to a dermatologist was provided.    3. Thyroid management.  She was advised to resume her thyroid medication regimen as soon as she returns home, even if it is not on an empty stomach. She was instructed to continue with her regular dosing schedule without trying to make up for the missed doses.    Follow-up  The patient is scheduled for a follow-up visit in 5 weeks.       BMI is within normal parameters. No other follow-up for BMI required.     There are no Patient  Instructions on file for this visit.   Follow up  recommended Return in about 5 weeks (around 1/27/2025).   - Dragon voice recognition software was utilized to complete this chart.  Every reasonable attempt was made to edit and correct the text, however some incorrect words may remain.   John Leroy DO    Patient or patient representative verbalized consent for the use of Ambient Listening during the visit with  John Leroy DO for chart documentation. 1/11/2025  19:28 EST

## 2025-01-18 DIAGNOSIS — E03.9 PRIMARY HYPOTHYROIDISM: ICD-10-CM

## 2025-01-20 RX ORDER — LIOTHYRONINE SODIUM 5 UG/1
TABLET ORAL
Qty: 450 TABLET | Refills: 1 | Status: SHIPPED | OUTPATIENT
Start: 2025-01-20

## 2025-01-30 ENCOUNTER — OFFICE VISIT (OUTPATIENT)
Dept: FAMILY MEDICINE CLINIC | Facility: CLINIC | Age: 75
End: 2025-01-30
Payer: MEDICAID

## 2025-01-30 VITALS
SYSTOLIC BLOOD PRESSURE: 118 MMHG | HEIGHT: 64 IN | DIASTOLIC BLOOD PRESSURE: 86 MMHG | RESPIRATION RATE: 16 BRPM | WEIGHT: 130 LBS | HEART RATE: 94 BPM | OXYGEN SATURATION: 98 % | BODY MASS INDEX: 22.2 KG/M2

## 2025-01-30 DIAGNOSIS — R25.1 TREMOR: Primary | ICD-10-CM

## 2025-01-30 DIAGNOSIS — L98.9 SKIN LESION: ICD-10-CM

## 2025-01-30 PROCEDURE — 1160F RVW MEDS BY RX/DR IN RCRD: CPT | Performed by: STUDENT IN AN ORGANIZED HEALTH CARE EDUCATION/TRAINING PROGRAM

## 2025-01-30 PROCEDURE — 99213 OFFICE O/P EST LOW 20 MIN: CPT | Performed by: STUDENT IN AN ORGANIZED HEALTH CARE EDUCATION/TRAINING PROGRAM

## 2025-01-30 PROCEDURE — 1159F MED LIST DOCD IN RCRD: CPT | Performed by: STUDENT IN AN ORGANIZED HEALTH CARE EDUCATION/TRAINING PROGRAM

## 2025-01-30 NOTE — PROGRESS NOTES
John Leroy,   [facility]  [address]  [place] [id number] [phone]    Subjective      Name Diya Zamora MRN [id number]    1950 AGE/SEX 74 y.o. / female      Chief Complaint Chief Complaint   Patient presents with    Tremors     5 week follow up     Hypothyroidism         Visit History for  2025    Diya Zamora is a 74 y.o. female who presented today for Tremors (5 week follow up ) and Hypothyroidism       History of Present Illness    She has been experiencing increased internal vibrations and tremors, particularly in her legs, which have been exacerbated by the use of CBD gummies. She reports that her right arm and hand are more severely affected than her left. She also experiences tremors when sitting with certain muscles engaged, but not when she is completely relaxed. She does not experience restless leg syndrome. She has expressed interest in trying low-dose naltrexone, specifically from [employer], which offers two protocols: one starting at 1.5 mg and increasing to 3 mg after 2 weeks, then to 4.5 mg, and another starting at 0.5 mg and increasing by 0.5 mg every 2 weeks. She is uncertain about which protocol to follow and is seeking advice. She has also inquired about the use of primidone for her tremors. Additionally, she reports that her leg trembles when she presses the gas pedal while driving, but she has found that stretching and relaxing her leg can alleviate this symptom. She has heard about vilLEHR ball and wonders if it would help with her tremors. She is questioning whether her thyroid medication could be contributing to her tremors.    She has attempted to schedule an appointment with a dermatologist for a skin tag but has encountered difficulties due to insurance coverage issues. She has been applying tea tree oil to the skin tag three times daily and believes it may be shrinking. She is considering monitoring  "the skin tag for changes before seeking further medical attention.       Medications and Allergies   Current Outpatient Medications   Medication Instructions    ADRENAL CORTEX  mg, Daily    BEE POLLEN PO Take  by mouth.    Berberine Chloride (BERBERINE HCI PO) 1,000 mg, Daily    CALCIUM LACTATE  mg    liothyronine (CYTOMEL) 5 MCG tablet TAKE 3 TABLETS BY MOUTH DAILY WITH LUCH AND 2 TABLETS BY MOUTH DAILY WITH DINNER.    liothyronine (CYTOMEL) 25 mcg, Oral, Daily    Lysine 1000 MG tablet Take  by mouth.    MAGNESIUM PO Take  by mouth.    Nutritional Supplements (NUTRITIONAL SUPPLEMENT PO) 500 mg    Nutritional Supplements (NUTRITIONAL SUPPLEMENT PO) 2 Times Daily    PATIENT SUPPLIED MEDICATION KAREN SEA MINERAL 2.0 ( 2 DROPPER )    Probiotic Product (PROBIOTIC BLEND PO) 2 Times Daily    TAURINE  mg    Tirosint 25 mcg, Oral, Daily    Tirosint 13 mcg, Oral, Daily    vitamin C (ASCORBIC ACID) 250 mg, Daily    vitamin D3 5,000 Units, Daily    Wheat Germ Oil oil 385 mg     No Known Allergies   I have reviewed the above medications and allergies     Objective:      Vitals Vitals:    01/30/25 1343   BP: 118/86   BP Location: Left arm   Patient Position: Sitting   Cuff Size: Adult   Pulse: 94   Resp: 16   SpO2: 98%   Weight: 59 kg (130 lb)   Height: 162.6 cm (64.02\")     Body mass index is 22.3 kg/m².    Physical Exam  Vitals reviewed.   Constitutional:       General: She is not in acute distress.     Appearance: She is not ill-appearing.   Pulmonary:      Effort: Pulmonary effort is normal.   Psychiatric:         Mood and Affect: Mood normal.         Behavior: Behavior normal.         Thought Content: Thought content normal.         Judgment: Judgment normal.          Physical Exam       Results       Assessment/Plan   Issues Addressed/ Plan   Diagnosis Plan   1. Tremor        2. Skin lesion  Ambulatory Referral to Dermatology         Assessment & Plan  1. Tremors.  The patient reports experiencing " increased tremors, particularly in the right arm and hand, which worsen upon waking and when engaging muscles. She has tried CBD gummies without success and feels more tremulous. A prescription for low-dose naltrexone will be initiated, starting at 0.5 mg and increasing by 0.5 mg every 2 weeks, as per the protocol from [facility]. She is advised to contact the pharmacy to confirm the protocol and ordering process. Potential side effects, including nausea, vomiting, abdominal pain, headaches, and increased anxiety, were discussed.    2. Skin lesion.  The patient presents with a skin lesion that appears to be a skin tag but could be precancerous. She has been applying tea tree oil three times a day and reports that the lesion seems to be shrinking. She is advised to monitor the lesion for changes in size, shape, and color. A referral to a dermatologist will be made for further evaluation and potential removal. If dermatology options are not available due to insurance issues, a referral to general surgery for lesion removal will be considered.     BMI is within normal parameters. No other follow-up for BMI required.     There are no Patient Instructions on file for this visit.   Follow up  recommended Return in about 1 month (around 2/28/2025).   - Dragon voice recognition software was utilized to complete this chart.  Every reasonable attempt was made to edit and correct the text, however some incorrect words may remain.   John Leroy DO    Patient or patient representative verbalized consent for the use of Ambient Listening during the visit with  John Leroy DO for chart documentation. 2/17/2025  00:08 EST

## 2025-02-07 ENCOUNTER — TELEPHONE (OUTPATIENT)
Dept: FAMILY MEDICINE CLINIC | Facility: CLINIC | Age: 75
End: 2025-02-07
Payer: MEDICARE

## 2025-02-07 DIAGNOSIS — E03.9 PRIMARY HYPOTHYROIDISM: ICD-10-CM

## 2025-02-07 RX ORDER — LEVOTHYROXINE SODIUM 13 UG/1
13 CAPSULE ORAL DAILY
Qty: 30 CAPSULE | Refills: 3 | Status: SHIPPED | OUTPATIENT
Start: 2025-02-07

## 2025-02-07 NOTE — TELEPHONE ENCOUNTER
Caller: Diya Zamora    Relationship: Self    Best call back number: [phone]     What is the best time to reach you: ANYTIME    Who are you requesting to speak with (clinical staff, provider,  specific staff member): KIM     What was the call regarding: PATIENT CALLING TO FOLLOW UP ON THE REQUEST TO SEND LAB RESULTS TO ANOTHER DR. PATIENT STATES THAT SHE SPOKE TO KIM ABOUT THIS YESTERDAY AND WAS TOLD THAT SHE WOULD HAVE TO FIND OUT IF THEY COULD SEND IT FOR HER AND WOULD CALL HER TO LET HER KNOW.    Is it okay if the provider responds through MyChart: NO

## 2025-02-22 DIAGNOSIS — E03.9 PRIMARY HYPOTHYROIDISM: ICD-10-CM

## 2025-02-24 RX ORDER — LIOTHYRONINE SODIUM 5 UG/1
TABLET ORAL
Qty: 450 TABLET | Refills: 1 | OUTPATIENT
Start: 2025-02-24

## 2025-04-28 DIAGNOSIS — E03.9 PRIMARY HYPOTHYROIDISM: ICD-10-CM

## 2025-04-28 RX ORDER — LEVOTHYROXINE SODIUM 25 UG/1
25 CAPSULE ORAL DAILY
Qty: 30 CAPSULE | Refills: 2 | Status: SHIPPED | OUTPATIENT
Start: 2025-04-28

## 2025-04-30 ENCOUNTER — OFFICE VISIT (OUTPATIENT)
Dept: FAMILY MEDICINE CLINIC | Facility: CLINIC | Age: 75
End: 2025-04-30
Payer: MEDICARE

## 2025-04-30 VITALS
HEART RATE: 104 BPM | WEIGHT: 123.8 LBS | OXYGEN SATURATION: 98 % | BODY MASS INDEX: 21.24 KG/M2 | SYSTOLIC BLOOD PRESSURE: 118 MMHG | DIASTOLIC BLOOD PRESSURE: 72 MMHG

## 2025-04-30 DIAGNOSIS — M25.511 ACUTE PAIN OF RIGHT SHOULDER: ICD-10-CM

## 2025-04-30 DIAGNOSIS — R73.03 PREDIABETES: ICD-10-CM

## 2025-04-30 DIAGNOSIS — E55.9 VITAMIN D DEFICIENCY: ICD-10-CM

## 2025-04-30 DIAGNOSIS — E78.2 MIXED HYPERLIPIDEMIA: ICD-10-CM

## 2025-04-30 DIAGNOSIS — Z00.00 MEDICARE ANNUAL WELLNESS VISIT, SUBSEQUENT: Primary | ICD-10-CM

## 2025-04-30 DIAGNOSIS — E03.9 ACQUIRED HYPOTHYROIDISM: ICD-10-CM

## 2025-04-30 DIAGNOSIS — B02.9 HERPES ZOSTER WITHOUT COMPLICATION: ICD-10-CM

## 2025-04-30 DIAGNOSIS — D22.9 ATYPICAL NEVI: ICD-10-CM

## 2025-04-30 DIAGNOSIS — E78.9 LIPID DISORDER: ICD-10-CM

## 2025-04-30 DIAGNOSIS — R53.83 FATIGUE, UNSPECIFIED TYPE: ICD-10-CM

## 2025-04-30 DIAGNOSIS — L98.9 SKIN LESION: ICD-10-CM

## 2025-04-30 PROCEDURE — G0439 PPPS, SUBSEQ VISIT: HCPCS | Performed by: STUDENT IN AN ORGANIZED HEALTH CARE EDUCATION/TRAINING PROGRAM

## 2025-04-30 PROCEDURE — 1126F AMNT PAIN NOTED NONE PRSNT: CPT | Performed by: STUDENT IN AN ORGANIZED HEALTH CARE EDUCATION/TRAINING PROGRAM

## 2025-04-30 RX ORDER — VALACYCLOVIR HYDROCHLORIDE 1 G/1
1000 TABLET, FILM COATED ORAL 3 TIMES DAILY
Qty: 30 TABLET | Refills: 0 | Status: SHIPPED | OUTPATIENT
Start: 2025-04-30 | End: 2025-05-10

## 2025-04-30 NOTE — PROGRESS NOTES
Subjective   The ABCs of the Annual Wellness Visit  Medicare Wellness Visit      Diya Zamora is a 74 y.o. patient who presents for a Medicare Wellness Visit.    The following portions of the patient's history were reviewed and   updated as appropriate: allergies, current medications, past family history, past medical history, past social history, past surgical history, and problem list.    Compared to one year ago, the patient's physical   health is the same.  Compared to one year ago, the patient's mental   health is the same.    Recent Hospitalizations:  She was not admitted to the hospital during the last year.     Current Medical Providers:  Patient Care Team:  John Leroy DO as PCP - General (Family Medicine)    Outpatient Medications Prior to Visit   Medication Sig Dispense Refill    BEE POLLEN PO Take  by mouth.      Berberine Chloride (BERBERINE HCI PO) Take 1,000 mg by mouth Daily.      CALCIUM LACTATE PO Take 260 mg by mouth.      liothyronine (CYTOMEL) 25 MCG tablet Take 1 tablet by mouth Daily. 180 tablet 1    liothyronine (CYTOMEL) 5 MCG tablet TAKE 3 TABLETS BY MOUTH DAILY WITH LUCH AND 2 TABLETS BY MOUTH DAILY WITH DINNER. 450 tablet 1    Lysine 1000 MG tablet Take  by mouth.      MAGNESIUM PO Take  by mouth.      Nutritional Supplements (NUTRITIONAL SUPPLEMENT PO) Take 500 mg by mouth. [employer] SRT B- free      Nutritional Supplements (NUTRITIONAL SUPPLEMENT PO) Take  by mouth 2 (Two) Times a Day. [employer] 2 phase   1-2 with meals      PATIENT SUPPLIED MEDICATION KAREN SEA MINERAL 2.0 ( 2 DROPPER )      Probiotic Product (PROBIOTIC BLEND PO) Take  by mouth 2 (Two) Times a Day. Sbulardii      TAURINE PO Take 500 mg by mouth. PURE ENCAPSULATE TAURINE      Tirosint 13 MCG capsule TAKE 1 CAPSULE BY MOUTH DAILY 30 capsule 3    Tirosint 25 MCG capsule TAKE 1 CAPSULE BY MOUTH DAILY 30 capsule 2    vitamin C (ASCORBIC ACID) 250 MG tablet Take 1 tablet by mouth Daily.  "     vitamin D3 125 MCG (5000 UT) capsule capsule Take 1 capsule by mouth Daily.      Wheat Germ Oil oil Take 385 mg by mouth.      ADRENAL CORTEX PO Take 200 mg by mouth Daily. (Patient not taking: Reported on 4/30/2025)       No facility-administered medications prior to visit.     No opioid medication identified on active medication list. I have reviewed chart for other potential  high risk medication/s and harmful drug interactions in the elderly.      Aspirin is not on active medication list.  Aspirin use is not indicated based on review of current medical condition/s. Risk of harm outweighs potential benefits.  .    Patient Active Problem List   Diagnosis    Confusion    Chronic fatigue    Primary hypothyroidism    Postmenopausal osteoporosis    Vitamin D deficiency    Menopausal symptoms    Herpes zoster    Fibromyositis     Advance Care Planning Advance Directive is not on file.  ACP discussion was held with the patient during this visit. Patient does not have an advance directive, information provided.            Objective   Vitals:    04/30/25 1028   BP: 118/72   BP Location: Left arm   Patient Position: Sitting   Cuff Size: Adult   Pulse: 104   SpO2: 98%   Weight: 56.2 kg (123 lb 12.8 oz)   PainSc: 0-No pain       Estimated body mass index is 21.24 kg/m² as calculated from the following:    Height as of 1/30/25: 162.6 cm (64.02\").    Weight as of this encounter: 56.2 kg (123 lb 12.8 oz).    BMI is within normal parameters. No other follow-up for BMI required.      Does the patient have evidence of cognitive impairment? No, mini cog was within normal.                                                                                        Health  Risk Assessment    Smoking Status:  Social History     Tobacco Use   Smoking Status Every Day    Current packs/day: 1.00    Average packs/day: 1 pack/day for 20.9 years (20.9 ttl pk-yrs)    Types: Cigarettes    Start date: 6/3/2024    Passive exposure: Past "   Smokeless Tobacco Never     Alcohol Consumption:  Social History     Substance and Sexual Activity   Alcohol Use Defer    Comment: social       Fall Risk Screen  BRIDGERADI Fall Risk Assessment was completed, and patient is at LOW risk for falls.Assessment completed on:2025    Depression Screening   Little interest or pleasure in doing things? Not at all   Feeling down, depressed, or hopeless? Not at all   PHQ-2 Total Score 0      Health Habits and Functional and Cognitive Screenin/30/2025    10:00 AM   Functional & Cognitive Status   Do you have difficulty preparing food and eating? Yes   Do you have difficulty bathing yourself, getting dressed or grooming yourself? No   Do you have difficulty using the toilet? No   Do you have difficulty moving around from place to place? No   Do you have trouble with steps or getting out of a bed or a chair? No   Current Diet Well Balanced Diet   Dental Exam Up to date   Eye Exam Up to date   Exercise (times per week) 7 times per week   Current Exercises Include Walking;Weightlifting   Do you need help using the phone?  No   Are you deaf or do you have serious difficulty hearing?  No   Do you need help to go to places out of walking distance? No   Do you need help shopping? No   Do you need help preparing meals?  No   Do you need help with housework?  No   Do you need help with laundry? No   Do you need help taking your medications? No   Do you need help managing money? No   Do you ever drive or ride in a car without wearing a seat belt? No   Have you felt unusual stress, anger or loneliness in the last month? No   Who do you live with? Alone   If you need help, do you have trouble finding someone available to you? Yes   Do you have difficulty concentrating, remembering or making decisions? Yes           Age-appropriate Screening Schedule:  Refer to the list below for future screening recommendations based on patient's age, sex and/or medical conditions. Orders for  these recommended tests are listed in the plan section. The patient has been provided with a written plan.    Health Maintenance List  Health Maintenance   Topic Date Due    Pneumococcal Vaccine 50+ (1 of 2 - PCV) Never done    ZOSTER VACCINE (1 of 2) Never done    TDAP/TD VACCINES (2 - Tdap) 01/05/2015    HEPATITIS C SCREENING  Never done    RSV Vaccine - Adults (1 - 1-dose 75+ series) Never done    COVID-19 Vaccine (1 - 2024-25 season) Never done    LIPID PANEL  02/09/2025    ANNUAL WELLNESS VISIT  03/12/2025    LUNG CANCER SCREENING  09/23/2025 (Originally 7/7/1999)    INFLUENZA VACCINE  07/01/2025    DXA SCAN  03/27/2026    COLORECTAL CANCER SCREENING  Discontinued                                                                                                                                                CMS Preventative Services Quick Reference  Risk Factors Identified During Encounter  None Identified    The above risks/problems have been discussed with the patient.  Pertinent information has been shared with the patient in the After Visit Summary.  An After Visit Summary and PPPS were made available to the patient.    Follow Up:   Next Medicare Wellness visit to be scheduled in 1 year.     Assessment & Plan  Medicare annual wellness visit, subsequent  Assessment & Plan  1. Medicare wellness visit.  - Short-term memory is intact, and good recall of words was demonstrated.  - No risk factors were identified during today's assessment.  - A copy of the advanced care directive was provided for reference.  - Advised to return next week for lab work, including free T3, free T4, reverse T3, TPO, antithyroglobulin, A1c, calcium, vitamin D, magnesium, RBC, selenium, and iodine.    2. Right shoulder pain.  - Pain appears to be associated with tendinopathy, potentially involving one of the bicep tendons or the deltoid.  - Physical examination revealed pain upon certain movements and palpation.  - Advised to continue  with the scheduled physical therapy appointment next Tuesday.  - Referral to Dr. Filemon Razo for dry needling was provided.    3. Herpes outbreak.  - Prescription for valacyclovir 1 g, to be taken three times daily for 10 days, was provided.  - Instructed to discontinue the medication once new lesions cease to appear.    4. Low-dose naltrexone side effects.  - Advised to reduce the dosage of low-dose naltrexone to 2.5 mg and maintain this dosage for a month before considering an increase.  - Discussed the potential for side effects and the need for gradual titration.           Skin lesion    Orders:    Ambulatory Referral to Dermatology    Atypical nevi    Orders:    Ambulatory Referral to Dermatology    Acute pain of right shoulder    Orders:    Ambulatory Referral to Physical Therapy for Evaluation & Treatment    Herpes zoster without complication    Orders:    valACYclovir (VALTREX) 1000 MG tablet; Take 1 tablet by mouth 3 (Three) Times a Day for 10 days.    Fatigue, unspecified type    Orders:    Magnesium; Future    CBC w AUTO Differential; Future    Selenium Serum; Future    Iodine, Serum or Plasma; Future    Prediabetes    Orders:    Hemoglobin A1c; Future    Comprehensive metabolic panel; Future    Vitamin D deficiency    Orders:    Vitamin D 25 hydroxy; Future    Mixed hyperlipidemia            Lipid disorder    Orders:    Lipid panel; Future    Acquired hypothyroidism    Orders:    T3, free; Future    T3, Reverse; Future    T4, free; Future    TSH; Future    Thyroid Antibodies; Future         Follow Up:   No follow-ups on file.

## 2025-04-30 NOTE — ASSESSMENT & PLAN NOTE
Orders:    valACYclovir (VALTREX) 1000 MG tablet; Take 1 tablet by mouth 3 (Three) Times a Day for 10 days.

## 2025-05-07 ENCOUNTER — CLINICAL SUPPORT (OUTPATIENT)
Dept: FAMILY MEDICINE CLINIC | Facility: CLINIC | Age: 75
End: 2025-05-07
Payer: MEDICARE

## 2025-05-07 DIAGNOSIS — E55.9 VITAMIN D DEFICIENCY: ICD-10-CM

## 2025-05-07 DIAGNOSIS — R73.03 PREDIABETES: ICD-10-CM

## 2025-05-07 DIAGNOSIS — R53.83 FATIGUE, UNSPECIFIED TYPE: ICD-10-CM

## 2025-05-07 DIAGNOSIS — E78.9 LIPID DISORDER: ICD-10-CM

## 2025-05-07 DIAGNOSIS — E03.9 ACQUIRED HYPOTHYROIDISM: ICD-10-CM

## 2025-05-07 PROCEDURE — 36415 COLL VENOUS BLD VENIPUNCTURE: CPT | Performed by: STUDENT IN AN ORGANIZED HEALTH CARE EDUCATION/TRAINING PROGRAM

## 2025-05-07 NOTE — PROGRESS NOTES
Venipuncture Blood Specimen Collection  Venipuncture performed in left arm by Kat Carolina MA with good hemostasis. Patient tolerated the procedure well without complications.   05/07/25   Kat Carolina MA  
GOAL: pt will perform all aspects of bed mobility independently in 2 weeks

## 2025-05-18 LAB
25(OH)D3+25(OH)D2 SERPL-MCNC: 64.3 NG/ML (ref 30–100)
ALBUMIN SERPL-MCNC: 4.5 G/DL (ref 3.8–4.8)
ALP SERPL-CCNC: 106 IU/L (ref 44–121)
ALT SERPL-CCNC: 12 IU/L (ref 0–32)
AST SERPL-CCNC: 18 IU/L (ref 0–40)
BASOPHILS # BLD AUTO: 0.1 X10E3/UL (ref 0–0.2)
BASOPHILS NFR BLD AUTO: 1 %
BILIRUB SERPL-MCNC: 0.3 MG/DL (ref 0–1.2)
BUN SERPL-MCNC: 18 MG/DL (ref 8–27)
BUN/CREAT SERPL: 29 (ref 12–28)
CALCIUM SERPL-MCNC: 9.9 MG/DL (ref 8.7–10.3)
CHLORIDE SERPL-SCNC: 103 MMOL/L (ref 96–106)
CHOLEST SERPL-MCNC: 199 MG/DL (ref 100–199)
CO2 SERPL-SCNC: 22 MMOL/L (ref 20–29)
CREAT SERPL-MCNC: 0.63 MG/DL (ref 0.57–1)
EGFRCR SERPLBLD CKD-EPI 2021: 92 ML/MIN/1.73
EOSINOPHIL # BLD AUTO: 0.2 X10E3/UL (ref 0–0.4)
EOSINOPHIL NFR BLD AUTO: 2 %
ERYTHROCYTE [DISTWIDTH] IN BLOOD BY AUTOMATED COUNT: 12.3 % (ref 11.7–15.4)
GLOBULIN SER CALC-MCNC: 2.5 G/DL (ref 1.5–4.5)
GLUCOSE SERPL-MCNC: 111 MG/DL (ref 70–99)
HBA1C MFR BLD: 5.9 % (ref 4.8–5.6)
HCT VFR BLD AUTO: 46.1 % (ref 34–46.6)
HDLC SERPL-MCNC: 52 MG/DL
HGB BLD-MCNC: 14.5 G/DL (ref 11.1–15.9)
IMM GRANULOCYTES # BLD AUTO: 0 X10E3/UL (ref 0–0.1)
IMM GRANULOCYTES NFR BLD AUTO: 0 %
IODINE SERPL-MCNC: 28.3 UG/L (ref 33.5–71.9)
LDLC SERPL CALC-MCNC: 116 MG/DL (ref 0–99)
LYMPHOCYTES # BLD AUTO: 2.8 X10E3/UL (ref 0.7–3.1)
LYMPHOCYTES NFR BLD AUTO: 38 %
MAGNESIUM SERPL-MCNC: 2 MG/DL (ref 1.6–2.3)
MCH RBC QN AUTO: 27.7 PG (ref 26.6–33)
MCHC RBC AUTO-ENTMCNC: 31.5 G/DL (ref 31.5–35.7)
MCV RBC AUTO: 88 FL (ref 79–97)
MONOCYTES # BLD AUTO: 0.5 X10E3/UL (ref 0.1–0.9)
MONOCYTES NFR BLD AUTO: 7 %
NEUTROPHILS # BLD AUTO: 3.8 X10E3/UL (ref 1.4–7)
NEUTROPHILS NFR BLD AUTO: 52 %
PLATELET # BLD AUTO: 260 X10E3/UL (ref 150–450)
POTASSIUM SERPL-SCNC: 4.3 MMOL/L (ref 3.5–5.2)
PROT SERPL-MCNC: 7 G/DL (ref 6–8.5)
RBC # BLD AUTO: 5.23 X10E6/UL (ref 3.77–5.28)
SELENIUM SERPL-MCNC: 129 UG/L (ref 93–198)
SODIUM SERPL-SCNC: 139 MMOL/L (ref 134–144)
T3FREE SERPL-MCNC: 3.2 PG/ML (ref 2–4.4)
T3REVERSE SERPL-MCNC: 10 NG/DL (ref 9.2–24.1)
T4 FREE SERPL-MCNC: 0.54 NG/DL (ref 0.82–1.77)
THYROGLOB AB SERPL-ACNC: <1 IU/ML (ref 0–0.9)
THYROPEROXIDASE AB SERPL-ACNC: 13 IU/ML (ref 0–34)
TRIGL SERPL-MCNC: 176 MG/DL (ref 0–149)
TSH SERPL DL<=0.005 MIU/L-ACNC: <0.005 UIU/ML (ref 0.45–4.5)
VLDLC SERPL CALC-MCNC: 31 MG/DL (ref 5–40)
WBC # BLD AUTO: 7.3 X10E3/UL (ref 3.4–10.8)

## 2025-05-23 DIAGNOSIS — E03.9 PRIMARY HYPOTHYROIDISM: ICD-10-CM

## 2025-05-23 RX ORDER — LIOTHYRONINE SODIUM 5 UG/1
TABLET ORAL
Qty: 450 TABLET | Refills: 1 | Status: SHIPPED | OUTPATIENT
Start: 2025-05-23

## 2025-06-04 DIAGNOSIS — E03.9 PRIMARY HYPOTHYROIDISM: ICD-10-CM

## 2025-06-04 NOTE — TELEPHONE ENCOUNTER
Rx Refill Note  Requested Prescriptions     Pending Prescriptions Disp Refills    Tirosint 13 MCG capsule [Pharmacy Med Name: TIROSINT 13MCG CAPSULES] 30 capsule 3     Sig: TAKE 1 CAPSULE BY MOUTH DAILY      Last office visit with prescribing clinician: 4/30/2025   Last telemedicine visit with prescribing clinician: Visit date not found   Next office visit with prescribing clinician: 7/31/2025

## 2025-06-06 ENCOUNTER — TELEPHONE (OUTPATIENT)
Dept: FAMILY MEDICINE CLINIC | Facility: CLINIC | Age: 75
End: 2025-06-06
Payer: MEDICARE

## 2025-06-06 NOTE — TELEPHONE ENCOUNTER
Patient called stating PT has suggested she see ortho and that she should reach out to us for a referral.     She also stated her physical therapist recommenced Adam Treadwell or John Chin for her ortho.    Advised PCP was out of office

## 2025-06-08 RX ORDER — LEVOTHYROXINE SODIUM 13 UG/1
13 CAPSULE ORAL DAILY
Qty: 30 CAPSULE | Refills: 3 | Status: SHIPPED | OUTPATIENT
Start: 2025-06-08

## 2025-06-18 ENCOUNTER — TELEPHONE (OUTPATIENT)
Dept: FAMILY MEDICINE CLINIC | Facility: CLINIC | Age: 75
End: 2025-06-18
Payer: MEDICARE

## 2025-06-18 NOTE — TELEPHONE ENCOUNTER
Patient called, stated that physical therapist recommended a referral to an orthopedic doctor and an MRI (of right shoulder). Physical therapist recommended Dr. Adam Treadwell or Dr. John Chin, patient would like to go to either doctor.

## 2025-06-25 DIAGNOSIS — E03.9 PRIMARY HYPOTHYROIDISM: ICD-10-CM

## 2025-06-25 RX ORDER — LEVOTHYROXINE SODIUM 13 UG/1
13 CAPSULE ORAL DAILY
Qty: 30 CAPSULE | Refills: 3 | Status: SHIPPED | OUTPATIENT
Start: 2025-06-25

## 2025-08-01 DIAGNOSIS — E03.9 PRIMARY HYPOTHYROIDISM: ICD-10-CM

## 2025-08-01 RX ORDER — LEVOTHYROXINE SODIUM 25 UG/1
25 CAPSULE ORAL DAILY
Qty: 30 CAPSULE | Refills: 2 | Status: SHIPPED | OUTPATIENT
Start: 2025-08-01

## 2025-08-05 ENCOUNTER — TELEPHONE (OUTPATIENT)
Dept: FAMILY MEDICINE CLINIC | Facility: CLINIC | Age: 75
End: 2025-08-05
Payer: MEDICARE